# Patient Record
Sex: FEMALE | Race: WHITE | NOT HISPANIC OR LATINO | Employment: FULL TIME | URBAN - METROPOLITAN AREA
[De-identification: names, ages, dates, MRNs, and addresses within clinical notes are randomized per-mention and may not be internally consistent; named-entity substitution may affect disease eponyms.]

---

## 2023-11-15 ENCOUNTER — APPOINTMENT (OUTPATIENT)
Dept: RADIOLOGY | Facility: HOSPITAL | Age: 33
End: 2023-11-15
Payer: COMMERCIAL

## 2023-11-15 ENCOUNTER — HOSPITAL ENCOUNTER (EMERGENCY)
Facility: HOSPITAL | Age: 33
Discharge: HOME | End: 2023-11-15
Attending: EMERGENCY MEDICINE
Payer: COMMERCIAL

## 2023-11-15 VITALS
DIASTOLIC BLOOD PRESSURE: 88 MMHG | OXYGEN SATURATION: 99 % | HEART RATE: 80 BPM | WEIGHT: 220 LBS | BODY MASS INDEX: 40.48 KG/M2 | SYSTOLIC BLOOD PRESSURE: 142 MMHG | TEMPERATURE: 98.1 F | RESPIRATION RATE: 16 BRPM | HEIGHT: 62 IN

## 2023-11-15 DIAGNOSIS — J06.9 VIRAL UPPER RESPIRATORY TRACT INFECTION: Primary | ICD-10-CM

## 2023-11-15 DIAGNOSIS — M62.838 MUSCLE SPASM: ICD-10-CM

## 2023-11-15 LAB
ALBUMIN SERPL BCP-MCNC: 4.5 G/DL (ref 3.4–5)
ALP SERPL-CCNC: 73 U/L (ref 33–110)
ALT SERPL W P-5'-P-CCNC: 9 U/L (ref 7–45)
ANION GAP SERPL CALC-SCNC: 12 MMOL/L (ref 10–20)
AST SERPL W P-5'-P-CCNC: 16 U/L (ref 9–39)
B-HCG SERPL-ACNC: <2 MIU/ML
BASOPHILS # BLD AUTO: 0.06 X10*3/UL (ref 0–0.1)
BASOPHILS NFR BLD AUTO: 0.6 %
BILIRUB SERPL-MCNC: 0.4 MG/DL (ref 0–1.2)
BNP SERPL-MCNC: 20 PG/ML (ref 0–99)
BUN SERPL-MCNC: 14 MG/DL (ref 6–23)
CALCIUM SERPL-MCNC: 9.5 MG/DL (ref 8.6–10.3)
CARDIAC TROPONIN I PNL SERPL HS: <3 NG/L (ref 0–13)
CARDIAC TROPONIN I PNL SERPL HS: <3 NG/L (ref 0–13)
CHLORIDE SERPL-SCNC: 101 MMOL/L (ref 98–107)
CO2 SERPL-SCNC: 29 MMOL/L (ref 21–32)
CREAT SERPL-MCNC: 0.8 MG/DL (ref 0.5–1.05)
EOSINOPHIL # BLD AUTO: 0.6 X10*3/UL (ref 0–0.7)
EOSINOPHIL NFR BLD AUTO: 5.9 %
ERYTHROCYTE [DISTWIDTH] IN BLOOD BY AUTOMATED COUNT: 12.4 % (ref 11.5–14.5)
FLUAV RNA RESP QL NAA+PROBE: NOT DETECTED
FLUBV RNA RESP QL NAA+PROBE: NOT DETECTED
GFR SERPL CREATININE-BSD FRML MDRD: >90 ML/MIN/1.73M*2
GLUCOSE SERPL-MCNC: 88 MG/DL (ref 74–99)
HCT VFR BLD AUTO: 41 % (ref 36–46)
HGB BLD-MCNC: 13 G/DL (ref 12–16)
IMM GRANULOCYTES # BLD AUTO: 0.03 X10*3/UL (ref 0–0.7)
IMM GRANULOCYTES NFR BLD AUTO: 0.3 % (ref 0–0.9)
LYMPHOCYTES # BLD AUTO: 2.28 X10*3/UL (ref 1.2–4.8)
LYMPHOCYTES NFR BLD AUTO: 22.6 %
MCH RBC QN AUTO: 28.1 PG (ref 26–34)
MCHC RBC AUTO-ENTMCNC: 31.7 G/DL (ref 32–36)
MCV RBC AUTO: 89 FL (ref 80–100)
MONOCYTES # BLD AUTO: 0.86 X10*3/UL (ref 0.1–1)
MONOCYTES NFR BLD AUTO: 8.5 %
NEUTROPHILS # BLD AUTO: 6.26 X10*3/UL (ref 1.2–7.7)
NEUTROPHILS NFR BLD AUTO: 62.1 %
NRBC BLD-RTO: 0 /100 WBCS (ref 0–0)
PLATELET # BLD AUTO: 344 X10*3/UL (ref 150–450)
POTASSIUM SERPL-SCNC: 3.8 MMOL/L (ref 3.5–5.3)
PROT SERPL-MCNC: 7.7 G/DL (ref 6.4–8.2)
RBC # BLD AUTO: 4.62 X10*6/UL (ref 4–5.2)
S PYO DNA THROAT QL NAA+PROBE: NOT DETECTED
SARS-COV-2 RNA RESP QL NAA+PROBE: NOT DETECTED
SODIUM SERPL-SCNC: 138 MMOL/L (ref 136–145)
WBC # BLD AUTO: 10.1 X10*3/UL (ref 4.4–11.3)

## 2023-11-15 PROCEDURE — 71046 X-RAY EXAM CHEST 2 VIEWS: CPT | Mod: FOREIGN READ | Performed by: RADIOLOGY

## 2023-11-15 PROCEDURE — 87636 SARSCOV2 & INF A&B AMP PRB: CPT | Performed by: EMERGENCY MEDICINE

## 2023-11-15 PROCEDURE — 84075 ASSAY ALKALINE PHOSPHATASE: CPT | Performed by: EMERGENCY MEDICINE

## 2023-11-15 PROCEDURE — 99283 EMERGENCY DEPT VISIT LOW MDM: CPT | Mod: 25

## 2023-11-15 PROCEDURE — 84484 ASSAY OF TROPONIN QUANT: CPT

## 2023-11-15 PROCEDURE — 84702 CHORIONIC GONADOTROPIN TEST: CPT | Performed by: EMERGENCY MEDICINE

## 2023-11-15 PROCEDURE — 94760 N-INVAS EAR/PLS OXIMETRY 1: CPT

## 2023-11-15 PROCEDURE — 99285 EMERGENCY DEPT VISIT HI MDM: CPT | Performed by: EMERGENCY MEDICINE

## 2023-11-15 PROCEDURE — 36415 COLL VENOUS BLD VENIPUNCTURE: CPT

## 2023-11-15 PROCEDURE — 36415 COLL VENOUS BLD VENIPUNCTURE: CPT | Performed by: EMERGENCY MEDICINE

## 2023-11-15 PROCEDURE — 99285 EMERGENCY DEPT VISIT HI MDM: CPT | Mod: 25 | Performed by: EMERGENCY MEDICINE

## 2023-11-15 PROCEDURE — 83880 ASSAY OF NATRIURETIC PEPTIDE: CPT | Performed by: EMERGENCY MEDICINE

## 2023-11-15 PROCEDURE — 85025 COMPLETE CBC W/AUTO DIFF WBC: CPT | Performed by: EMERGENCY MEDICINE

## 2023-11-15 PROCEDURE — 87651 STREP A DNA AMP PROBE: CPT | Performed by: EMERGENCY MEDICINE

## 2023-11-15 PROCEDURE — 71046 X-RAY EXAM CHEST 2 VIEWS: CPT | Mod: FY

## 2023-11-15 PROCEDURE — 84484 ASSAY OF TROPONIN QUANT: CPT | Performed by: EMERGENCY MEDICINE

## 2023-11-15 RX ORDER — CYCLOBENZAPRINE HCL 5 MG
5 TABLET ORAL 3 TIMES DAILY PRN
Qty: 21 TABLET | Refills: 0 | Status: SHIPPED | OUTPATIENT
Start: 2023-11-15 | End: 2023-11-22

## 2023-11-15 ASSESSMENT — LIFESTYLE VARIABLES
HAVE YOU EVER FELT YOU SHOULD CUT DOWN ON YOUR DRINKING: NO
HAVE PEOPLE ANNOYED YOU BY CRITICIZING YOUR DRINKING: NO
REASON UNABLE TO ASSESS: NO
EVER FELT BAD OR GUILTY ABOUT YOUR DRINKING: NO
EVER HAD A DRINK FIRST THING IN THE MORNING TO STEADY YOUR NERVES TO GET RID OF A HANGOVER: NO

## 2023-11-15 ASSESSMENT — COLUMBIA-SUICIDE SEVERITY RATING SCALE - C-SSRS
2. HAVE YOU ACTUALLY HAD ANY THOUGHTS OF KILLING YOURSELF?: NO
1. IN THE PAST MONTH, HAVE YOU WISHED YOU WERE DEAD OR WISHED YOU COULD GO TO SLEEP AND NOT WAKE UP?: NO
6. HAVE YOU EVER DONE ANYTHING, STARTED TO DO ANYTHING, OR PREPARED TO DO ANYTHING TO END YOUR LIFE?: NO

## 2023-11-15 ASSESSMENT — PAIN - FUNCTIONAL ASSESSMENT: PAIN_FUNCTIONAL_ASSESSMENT: 0-10

## 2023-11-15 ASSESSMENT — PAIN SCALES - GENERAL: PAINLEVEL_OUTOF10: 5 - MODERATE PAIN

## 2023-11-15 NOTE — DISCHARGE INSTRUCTIONS
Follow up with your PCP as needed. Return to the emergency room if your symptoms worsen or you develop trouble breathing, chest pain and continued fevers or chills.

## 2023-11-15 NOTE — ED PROVIDER NOTES
"EMERGENCY DEPARTMENT ENCOUNTER      Pt Name: Mallika Gutiérrez  MRN: [id number]  Birthdate 1990  Date of evaluation: 11/15/2023  Provider: Yrn Kapoor MD    CHIEF COMPLAINT       Chief Complaint   Patient presents with    Mouth Lesions     Patienet was seen in urgent care and dx with tonsilitis on Monday; since the has had a worsening productive cough with pain that radiates to the back and down the R arm, congestion, chest tightness, lacerations to the mouth that \"have worsened\" and a sore throat. Patient started the PO Amoxicillin Monday with no improvement         HISTORY OF PRESENT ILLNESS    HPI  Mallika Gutiérrez is a 33-year-old female with a past medical history of anxiety and depression who reports to the ED with chief complaint of URI symptoms.  Patient states that last Friday she began to experience a sore throat but that it progressed to a productive cough, congestion and rhinorrhea.  She went to urgent care on Monday and was diagnosed with tonsillitis and started on amoxicillin.  She is still continued to take the Amoxil and has not completed the antibiotic course.  She reports that her URI symptoms have worsened since onset which prompted her day come to the ED.  She reports that she is also been having \"firework-like\" back pain that spreads to her right arm. The pain is mostly exacerbated by coughing.  She has tried Tylenol but has not seem to get any type of relief.  She also endorses fevers, chills, and mouth ulcer on her hard palate but denies headaches, vision changes, chest pain, shortness of breath, palpitations, abdominal pain, nausea, vomiting and diarrhea.      Nursing Notes were reviewed.    PAST MEDICAL HISTORY   History reviewed. No pertinent past medical history.      SURGICAL HISTORY     History reviewed. No pertinent surgical history.      CURRENT MEDICATIONS       Discharge Medication List as of 11/15/2023  6:07 PM          ALLERGIES     Ceclor [cefaclor]    FAMILY HISTORY     No " family history on file.       SOCIAL HISTORY       Social History     Socioeconomic History    Marital status: Single     Spouse name: None    Number of children: None    Years of education: None    Highest education level: None   Occupational History    None   Tobacco Use    Smoking status: Never    Smokeless tobacco: Never   Vaping Use    Vaping Use: Never used   Substance and Sexual Activity    Alcohol use: None    Drug use: Never    Sexual activity: None   Other Topics Concern    None   Social History Narrative    None     Social Determinants of Health     Financial Resource Strain: Not on file   Food Insecurity: Not on file   Transportation Needs: Not on file   Physical Activity: Not on file   Stress: Not on file   Social Connections: Not on file   Intimate Partner Violence: Not on file   Housing Stability: Not on file       SCREENINGS                        PHYSICAL EXAM    (up to 7 for level 4, 8 or more for level 5)     ED Triage Vitals   Temp Heart Rate Resp BP   11/15/23 1518 11/15/23 1518 11/15/23 1518 11/15/23 1518   36.7 °C (98.1 °F) 84 16 142/88      SpO2 Temp src Heart Rate Source Patient Position   11/15/23 1518 -- 11/15/23 1801 11/15/23 1518   99 %  Monitor Sitting      BP Location FiO2 (%)     11/15/23 1518 --     Right arm        REVIEW OF SYSTEMS:    CONSTITUTIONAL: Reports denies fever, chills.   NEURO: Denies headache.   HEENT: Reports sore throat, rhinorrhea Denies changes in vision.   CARDIO: Denies chest pain, palpitations.  PULM: Reports cough Denies shortness of breath  GI: Denies abdominal pain, nausea, vomiting       Physical Exam  Vitals and nursing note reviewed.   Constitutional:       General: She is not in acute distress.  HENT:      Right Ear: Tympanic membrane, ear canal and external ear normal.      Left Ear: Tympanic membrane, ear canal and external ear normal.      Nose: Congestion and rhinorrhea present.      Mouth/Throat:      Mouth: Mucous membranes are moist.      Pharynx:  No oropharyngeal exudate or posterior oropharyngeal erythema.   Cardiovascular:      Rate and Rhythm: Normal rate and regular rhythm.      Pulses: Normal pulses.      Heart sounds: Normal heart sounds. No murmur heard.     No gallop.   Pulmonary:      Effort: Pulmonary effort is normal.      Breath sounds: Wheezing present.   Abdominal:      General: Abdomen is flat. Bowel sounds are normal.      Palpations: Abdomen is soft.      Tenderness: There is no abdominal tenderness.   Musculoskeletal:      Cervical back: Neck supple.   Skin:     General: Skin is warm and dry.      Capillary Refill: Capillary refill takes less than 2 seconds.   Neurological:      Mental Status: She is alert and oriented to person, place, and time.   Psychiatric:         Mood and Affect: Mood normal.         Behavior: Behavior normal.          DIAGNOSTIC RESULTS     LABS:  Labs Reviewed   CBC WITH AUTO DIFFERENTIAL - Abnormal       Result Value    WBC 10.1      nRBC 0.0      RBC 4.62      Hemoglobin 13.0      Hematocrit 41.0      MCV 89      MCH 28.1      MCHC 31.7 (*)     RDW 12.4      Platelets 344      Neutrophils % 62.1      Immature Granulocytes %, Automated 0.3      Lymphocytes % 22.6      Monocytes % 8.5      Eosinophils % 5.9      Basophils % 0.6      Neutrophils Absolute 6.26      Immature Granulocytes Absolute, Automated 0.03      Lymphocytes Absolute 2.28      Monocytes Absolute 0.86      Eosinophils Absolute 0.60      Basophils Absolute 0.06     GROUP A STREPTOCOCCUS, PCR - Normal    Group A Strep PCR Not Detected     COMPREHENSIVE METABOLIC PANEL - Normal    Glucose 88      Sodium 138      Potassium 3.8      Chloride 101      Bicarbonate 29      Anion Gap 12      Urea Nitrogen 14      Creatinine 0.80      eGFR >90      Calcium 9.5      Albumin 4.5      Alkaline Phosphatase 73      Total Protein 7.7      AST 16      Bilirubin, Total 0.4      ALT 9     B-TYPE NATRIURETIC PEPTIDE - Normal    BNP 20      Narrative:        <100 pg/mL  - Heart failure unlikely  100-299 pg/mL - Intermediate probability of acute heart                  failure exacerbation. Correlate with clinical                  context and patient history.    >=300 pg/mL - Heart Failure likely. Correlate with clinical                  context and patient history.    BNP testing is performed using different testing methodology at [facility] than at other [facility]. Direct result comparisons should only be made within the same method.      TROPONIN I, HIGH SENSITIVITY - Normal    Troponin I, High Sensitivity <3      Narrative:     Less than 99th percentile of normal range cutoff-  Female and children under 18 years old <14 ng/L; Male <21 ng/L: Negative  Repeat testing should be performed if clinically indicated.     Female and children under 18 years old 14-50 ng/L; Male 21-50 ng/L:  Consistent with possible cardiac damage and possible increased clinical   risk. Serial measurements may help to assess extent of myocardial damage.     >50 ng/L: Consistent with cardiac damage, increased clinical risk and  myocardial infarction. Serial measurements may help assess extent of   myocardial damage.      NOTE: Children less than 1 year old may have higher baseline troponin   levels and results should be interpreted in conjunction with the overall   clinical context.     NOTE: Troponin I testing is performed using a different   testing methodology at [facility] than at other   [facility]. Direct result comparisons should only   be made within the same method.   HUMAN CHORIONIC GONADOTROPIN, SERUM QUANTITATIVE - Normal    HCG, Beta-Quantitative <2      Narrative:      Total HCG measurement is performed using the [employer] Access   Immunoassay which detects intact HCG and free beta HCG subunit.    This test is not indicated for use as a tumor marker.   HCG testing is performed using a different test methodology at [facility] than  [facility]. Direct result comparison   should only be made within the same method.       SARS-COV-2 PCR, SYMPTOMATIC - Normal    Coronavirus 2019, PCR Not Detected      Narrative:     This assay has received FDA Emergency Use Authorization (EUA) and is only authorized for the duration of time that circumstances exist to justify the authorization of the emergency use of in vitro diagnostic tests for the detection of SARS-CoV-2 virus and/or diagnosis of COVID-19 infection under section 564(b)(1) of the Act, 21 U.S.C. 360bbb-3(b)(1). This assay is an in vitro diagnostic nucleic acid amplification test for the qualitative detection of SARS-CoV-2 from nasopharyngeal specimens and has been validated for use at [facility]. Negative results do not preclude COVID-19 infections and should not be used as the sole basis for diagnosis, treatment, or other management decisions.     INFLUENZA A AND B PCR - Normal    Flu A Result Not Detected      Flu B Result Not Detected      Narrative:     This assay is an in vitro diagnostic multiplex nucleic acid amplification test for the detection and discrimination of Influenza A & B from nasopharyngeal specimens, and has been validated for use at [facility]. Negative results do not preclude Influenza A/B infections, and should not be used as the sole basis for diagnosis, treatment, or other management decisions. If Influenza A/B and RSV PCR results are negative, testing for Parainfluenza virus, Adenovirus and Metapneumovirus is routinely performed for [facility] and intensive care inpatients, and is available on other patients by placing an add-on request.   TROPONIN I, HIGH SENSITIVITY - Normal    Troponin I, High Sensitivity <3      Narrative:     Less than 99th percentile of normal range cutoff-  Female and children under 18 years old <14 ng/L; Male <21 ng/L: Negative  Repeat testing should be performed if  "clinically indicated.     Female and children under 18 years old 14-50 ng/L; Male 21-50 ng/L:  Consistent with possible cardiac damage and possible increased clinical   risk. Serial measurements may help to assess extent of myocardial damage.     >50 ng/L: Consistent with cardiac damage, increased clinical risk and  myocardial infarction. Serial measurements may help assess extent of   myocardial damage.      NOTE: Children less than 1 year old may have higher baseline troponin   levels and results should be interpreted in conjunction with the overall   clinical context.     NOTE: Troponin I testing is performed using a different   testing methodology at [facility] than at other   [facility]. Direct result comparisons should only   be made within the same method.       All other labs were within normal range or not returned as of this dictation.    Imaging  XR chest 2 views   Final Result   No detectable active cardiopulmonary disease.   Signed by Kayden Rey MD           Procedures  Procedures     EMERGENCY DEPARTMENT COURSE/MDM:     Diagnoses as of 11/15/23 1839   Muscle spasm   Viral upper respiratory tract infection        Medical Decision Making  Mallika Gutiérrez is a 33-year-old female with a past medical history of anxiety and depression who reports to the ED with chief complaint of worsening URI symptoms and \"firework-like\" back pain that spreads to her right arm when she coughs. Upon arrival to the ED, patient's vitals were stable. She denies any cardiovascular risk factors. Heart score was noted to be 0. She did have some wheezing noted on physical exam especially during coughing episodes, otherwise there were no other concerns and she was well-appearing. We still proceeded with a cardiac work up in order to rule out any atypical presentations of ACS.     EKG showed normal sinus rhythm with no ST segment abnormalities. No signs of ischemia noted    There were no concerns on blood work. " Chest x-ray also showed no acute cardiopulmomary processes.    COVID, influenza, and group A strep swabs were negative.    Patient's symptoms is likely due to a viral respiratory infection.  Her back pain is exacerbated by coughing and therefore is likely due to irritation of her muscles from repetitive coughing.  We instructed her to continue with her amoxicillin antibiotic course but sent her home with a prescription for Flexeril for her MSK pain. Instructed her to take over the counter cough medication for extra support and return to the ED if her symptoms worsen or she develops chest pain or trouble breathing.     Patient and or family in agreement and understanding of treatment plan.  All questions answered.      I reviewed the case with the attending ED physician. The attending ED physician agrees with the plan. Patient and/or patient´s representative was counseled regarding labs, imaging, likely diagnosis, and plan. All questions were answered.    ED Medications administered this visit:  Medications - No data to display    New Prescriptions from this visit:    Discharge Medication List as of 11/15/2023  6:07 PM        START taking these medications    Details   cyclobenzaprine (Flexeril) 5 mg tablet Take 1 tablet (5 mg) by mouth 3 times a day as needed for muscle spasms for up to 7 days., Starting Wed 11/15/2023, Until Wed 11/22/2023 at 2359, Normal             Follow-up:  Leslie Matos MD MPH  [address] [place] [address]  [phone]      As needed        Final Impression:   1. Viral upper respiratory tract infection    2. Muscle spasm          (Please note that portions of this note were completed with a voice recognition program.  Efforts were made to edit the dictations but occasionally words are mis-transcribed.)     Yrn Kapoor MD  Resident  11/15/23 3309

## 2023-11-17 ENCOUNTER — HOSPITAL ENCOUNTER (OUTPATIENT)
Dept: CARDIOLOGY | Facility: HOSPITAL | Age: 33
Discharge: HOME | End: 2023-11-17
Payer: COMMERCIAL

## 2023-11-17 LAB
ATRIAL RATE: 85 BPM
ATRIAL RATE: 87 BPM
P AXIS: -1 DEGREES
P AXIS: 43 DEGREES
P OFFSET: 171 MS
P OFFSET: 214 MS
P ONSET: 122 MS
P ONSET: 158 MS
PR INTERVAL: 142 MS
PR INTERVAL: 170 MS
Q ONSET: 207 MS
Q ONSET: 229 MS
QRS COUNT: 14 BEATS
QRS COUNT: 14 BEATS
QRS DURATION: 90 MS
QRS DURATION: 94 MS
QT INTERVAL: 354 MS
QT INTERVAL: 384 MS
QTC CALCULATION(BAZETT): 421 MS
QTC CALCULATION(BAZETT): 462 MS
QTC FREDERICIA: 397 MS
QTC FREDERICIA: 434 MS
R AXIS: -21 DEGREES
R AXIS: 93 DEGREES
T AXIS: 10 DEGREES
T AXIS: 3 DEGREES
T OFFSET: 399 MS
T OFFSET: 406 MS
VENTRICULAR RATE: 85 BPM
VENTRICULAR RATE: 87 BPM

## 2023-11-17 PROCEDURE — 93005 ELECTROCARDIOGRAM TRACING: CPT

## 2023-12-17 ENCOUNTER — HOSPITAL ENCOUNTER (EMERGENCY)
Facility: HOSPITAL | Age: 33
Discharge: HOME | End: 2023-12-17
Attending: EMERGENCY MEDICINE
Payer: COMMERCIAL

## 2023-12-17 VITALS
TEMPERATURE: 97.9 F | WEIGHT: 220 LBS | SYSTOLIC BLOOD PRESSURE: 130 MMHG | HEART RATE: 91 BPM | BODY MASS INDEX: 40.48 KG/M2 | RESPIRATION RATE: 20 BRPM | DIASTOLIC BLOOD PRESSURE: 76 MMHG | OXYGEN SATURATION: 96 % | HEIGHT: 62 IN

## 2023-12-17 DIAGNOSIS — J01.90 ACUTE NON-RECURRENT SINUSITIS, UNSPECIFIED LOCATION: Primary | ICD-10-CM

## 2023-12-17 LAB
FLUAV RNA RESP QL NAA+PROBE: NOT DETECTED
FLUBV RNA RESP QL NAA+PROBE: NOT DETECTED
SARS-COV-2 RNA RESP QL NAA+PROBE: DETECTED

## 2023-12-17 PROCEDURE — 99283 EMERGENCY DEPT VISIT LOW MDM: CPT | Performed by: EMERGENCY MEDICINE

## 2023-12-17 PROCEDURE — 99284 EMERGENCY DEPT VISIT MOD MDM: CPT | Performed by: EMERGENCY MEDICINE

## 2023-12-17 PROCEDURE — 87636 SARSCOV2 & INF A&B AMP PRB: CPT | Performed by: EMERGENCY MEDICINE

## 2023-12-17 RX ORDER — AMOXICILLIN AND CLAVULANATE POTASSIUM 875; 125 MG/1; MG/1
1 TABLET, FILM COATED ORAL EVERY 12 HOURS
Qty: 20 TABLET | Refills: 0 | Status: SHIPPED | OUTPATIENT
Start: 2023-12-17 | End: 2023-12-27

## 2023-12-17 ASSESSMENT — PAIN DESCRIPTION - LOCATION: LOCATION: GENERALIZED

## 2023-12-17 ASSESSMENT — COLUMBIA-SUICIDE SEVERITY RATING SCALE - C-SSRS
6. HAVE YOU EVER DONE ANYTHING, STARTED TO DO ANYTHING, OR PREPARED TO DO ANYTHING TO END YOUR LIFE?: NO
1. IN THE PAST MONTH, HAVE YOU WISHED YOU WERE DEAD OR WISHED YOU COULD GO TO SLEEP AND NOT WAKE UP?: NO
2. HAVE YOU ACTUALLY HAD ANY THOUGHTS OF KILLING YOURSELF?: NO

## 2023-12-17 ASSESSMENT — PAIN SCALES - GENERAL
PAINLEVEL_OUTOF10: 5 - MODERATE PAIN
PAINLEVEL_OUTOF10: 5 - MODERATE PAIN

## 2023-12-17 ASSESSMENT — LIFESTYLE VARIABLES
HAVE YOU EVER FELT YOU SHOULD CUT DOWN ON YOUR DRINKING: NO
EVER HAD A DRINK FIRST THING IN THE MORNING TO STEADY YOUR NERVES TO GET RID OF A HANGOVER: NO
EVER FELT BAD OR GUILTY ABOUT YOUR DRINKING: NO
HAVE PEOPLE ANNOYED YOU BY CRITICIZING YOUR DRINKING: NO
REASON UNABLE TO ASSESS: NO

## 2023-12-17 ASSESSMENT — PAIN - FUNCTIONAL ASSESSMENT: PAIN_FUNCTIONAL_ASSESSMENT: 0-10

## 2023-12-17 ASSESSMENT — PAIN DESCRIPTION - PAIN TYPE: TYPE: ACUTE PAIN

## 2023-12-17 NOTE — ED NOTES
Discharge instructions reviewed with patient. Stable and ambulatory at time of discharge.      Thelma Camarillo RN  12/17/23 5167

## 2023-12-17 NOTE — ED PROVIDER NOTES
HPI   Chief Complaint   Patient presents with   • Flu Symptoms     Cough, fever, horse, sore throat, shortness of breath       33-year-old female presenting to the emergency department.  Patient has been having URI symptoms for the past 2 months she states.  She states that she has been having nasal congestion and cough.  She states cough is intermittently productive.  Patient has been having some sinus fullness over this time as well.  She had a fever this morning which is what prompted her to come to the emergency department to be evaluated.  She denies any chest pain, abdominal pain.  She had some vomiting after coughing but otherwise denies any nausea or vomiting.  No diarrhea.  She denies any dysuria hematuria urinary frequency.  She states she had a negative flu and COVID swab at [facility] yesterday.                          Dwayne Coma Scale Score: 15                  Patient History   No past medical history on file.  No past surgical history on file.  No family history on file.  Social History     Tobacco Use   • Smoking status: Never   • Smokeless tobacco: Never   Vaping Use   • Vaping Use: Never used   Substance Use Topics   • Alcohol use: Not on file   • Drug use: Never       Physical Exam   ED Triage Vitals [12/17/23 0818]   Temp Heart Rate Resp BP   36.6 °C (97.9 °F) 98 18 131/83      SpO2 Temp Source Heart Rate Source Patient Position   95 % Temporal Monitor --      BP Location FiO2 (%)     -- --       Physical Exam  Vitals and nursing note reviewed.   Constitutional:       General: She is not in acute distress.     Appearance: Normal appearance. She is not ill-appearing or toxic-appearing.   HENT:      Head: Normocephalic and atraumatic.      Right Ear: Tympanic membrane normal.      Left Ear: Tympanic membrane normal.      Nose: Congestion present.      Comments: Swelling of the left nasal turbinate.     Mouth/Throat:      Mouth: Mucous membranes are moist.      Pharynx: No oropharyngeal exudate or  posterior oropharyngeal erythema.      Comments: Posterior pharyngeal cobblestoning present.  Uvula midline  Eyes:      General:         Right eye: No discharge.         Left eye: No discharge.      Extraocular Movements: Extraocular movements intact.      Pupils: Pupils are equal, round, and reactive to light.   Neck:      Comments: Full ROM  Cardiovascular:      Rate and Rhythm: Normal rate and regular rhythm.      Pulses: Normal pulses.      Heart sounds: Normal heart sounds.   Pulmonary:      Effort: Pulmonary effort is normal.      Breath sounds: Normal breath sounds.   Abdominal:      General: Abdomen is flat. There is no distension.      Palpations: Abdomen is soft.      Tenderness: There is no abdominal tenderness.   Musculoskeletal:         General: No swelling or tenderness. Normal range of motion.      Cervical back: Normal range of motion and neck supple.   Skin:     General: Skin is warm.      Capillary Refill: Capillary refill takes less than 2 seconds.   Neurological:      General: No focal deficit present.      Mental Status: She is alert and oriented to person, place, and time.      Sensory: No sensory deficit.      Motor: No weakness.   Psychiatric:         Mood and Affect: Mood normal.         Behavior: Behavior normal.         ED Course & MDM   Diagnoses as of 12/17/23 0836   Acute non-recurrent sinusitis, unspecified location       Medical Decision Making  Patient presenting with URI symptoms for 2 months.  Symptoms worsened over the last couple of days and she developed fever again today.  She is afebrile and hemodynamically stable here in the emergency department.  She is in no distress.  She has had some sinus fullness and has swelling in the left nasal turbinate.  Lungs are clear to auscultation.  Patient is refusing COVID and flu testing here she just had it yesterday and was negative.    Given length of symptoms and recurrent fever will treat the patient for sinusitis.  She will be treated  with Augmentin, she states that she has tolerated penicillins in the past.  Patient is given primary care doctor to follow-up with.  She is to return with persistent fevers, any shortness of breath or any new or worsening symptoms.  Patient understands and agrees stated plan.        Procedure  Procedures     Luke Sparks,   12/17/23 0836

## 2023-12-18 ENCOUNTER — TELEPHONE (OUTPATIENT)
Dept: PHARMACY | Facility: HOSPITAL | Age: 33
End: 2023-12-18
Payer: COMMERCIAL

## 2023-12-18 NOTE — PROGRESS NOTES
EDPD Note: Rapid Result Review    Reviewed Ms. Mallika Gutiérrez 's chart regarding a positive COVID-19 culture/result that was taken during their recent emergency room visit. The patient was not told about these results prior to leaving the emergency department. Therefore, patient was contacted and given proper education.         Component  Ref Range & Units    Flu A Result  Not Detected Not Detected   Flu B Result  Not Detected Not Detected   Coronavirus 2019, PCR  Not Detected Detected Abnormal           No further follow up needed from EDPD Team.     Areli Mcguire, PharmD

## 2023-12-27 ENCOUNTER — LAB (OUTPATIENT)
Dept: LAB | Facility: LAB | Age: 33
End: 2023-12-27
Payer: COMMERCIAL

## 2023-12-27 ENCOUNTER — OFFICE VISIT (OUTPATIENT)
Dept: PRIMARY CARE | Facility: CLINIC | Age: 33
End: 2023-12-27
Payer: COMMERCIAL

## 2023-12-27 VITALS
HEART RATE: 88 BPM | DIASTOLIC BLOOD PRESSURE: 82 MMHG | HEIGHT: 62 IN | SYSTOLIC BLOOD PRESSURE: 124 MMHG | TEMPERATURE: 98.1 F | OXYGEN SATURATION: 92 % | BODY MASS INDEX: 44.53 KG/M2 | WEIGHT: 242 LBS

## 2023-12-27 DIAGNOSIS — Z13.220 SCREENING FOR LIPID DISORDERS: ICD-10-CM

## 2023-12-27 DIAGNOSIS — F51.01 PRIMARY INSOMNIA: ICD-10-CM

## 2023-12-27 DIAGNOSIS — Z86.16 HISTORY OF COVID-19: Primary | ICD-10-CM

## 2023-12-27 DIAGNOSIS — Z80.8 FAMILY HISTORY OF THYROID CANCER: ICD-10-CM

## 2023-12-27 DIAGNOSIS — Z86.16 HISTORY OF COVID-19: ICD-10-CM

## 2023-12-27 DIAGNOSIS — F32.A CHRONIC DEPRESSION: ICD-10-CM

## 2023-12-27 DIAGNOSIS — Z13.29 SCREENING FOR THYROID DISORDER: ICD-10-CM

## 2023-12-27 LAB
ALBUMIN SERPL BCP-MCNC: 4.2 G/DL (ref 3.4–5)
ALP SERPL-CCNC: 73 U/L (ref 33–110)
ALT SERPL W P-5'-P-CCNC: 12 U/L (ref 7–45)
ANION GAP SERPL CALC-SCNC: 14 MMOL/L (ref 10–20)
AST SERPL W P-5'-P-CCNC: 15 U/L (ref 9–39)
BASOPHILS # BLD AUTO: 0.07 X10*3/UL (ref 0–0.1)
BASOPHILS NFR BLD AUTO: 0.6 %
BILIRUB SERPL-MCNC: 0.5 MG/DL (ref 0–1.2)
BUN SERPL-MCNC: 13 MG/DL (ref 6–23)
CALCIUM SERPL-MCNC: 9.4 MG/DL (ref 8.6–10.3)
CHLORIDE SERPL-SCNC: 103 MMOL/L (ref 98–107)
CHOLEST SERPL-MCNC: 219 MG/DL (ref 0–199)
CHOLESTEROL/HDL RATIO: 5.9
CO2 SERPL-SCNC: 28 MMOL/L (ref 21–32)
CREAT SERPL-MCNC: 0.9 MG/DL (ref 0.5–1.05)
EOSINOPHIL # BLD AUTO: 0.31 X10*3/UL (ref 0–0.7)
EOSINOPHIL NFR BLD AUTO: 2.6 %
ERYTHROCYTE [DISTWIDTH] IN BLOOD BY AUTOMATED COUNT: 12.2 % (ref 11.5–14.5)
GFR SERPL CREATININE-BSD FRML MDRD: 86 ML/MIN/1.73M*2
GLUCOSE SERPL-MCNC: 93 MG/DL (ref 74–99)
HCT VFR BLD AUTO: 42.2 % (ref 36–46)
HDLC SERPL-MCNC: 37.3 MG/DL
HGB BLD-MCNC: 13.3 G/DL (ref 12–16)
IMM GRANULOCYTES # BLD AUTO: 0.12 X10*3/UL (ref 0–0.7)
IMM GRANULOCYTES NFR BLD AUTO: 1 % (ref 0–0.9)
LDLC SERPL CALC-MCNC: 153 MG/DL
LYMPHOCYTES # BLD AUTO: 4.65 X10*3/UL (ref 1.2–4.8)
LYMPHOCYTES NFR BLD AUTO: 39.5 %
MCH RBC QN AUTO: 28.6 PG (ref 26–34)
MCHC RBC AUTO-ENTMCNC: 31.5 G/DL (ref 32–36)
MCV RBC AUTO: 91 FL (ref 80–100)
MONOCYTES # BLD AUTO: 0.74 X10*3/UL (ref 0.1–1)
MONOCYTES NFR BLD AUTO: 6.3 %
NEUTROPHILS # BLD AUTO: 5.89 X10*3/UL (ref 1.2–7.7)
NEUTROPHILS NFR BLD AUTO: 50 %
NON HDL CHOLESTEROL: 182 MG/DL (ref 0–149)
NRBC BLD-RTO: 0 /100 WBCS (ref 0–0)
PLATELET # BLD AUTO: 474 X10*3/UL (ref 150–450)
POTASSIUM SERPL-SCNC: 4.8 MMOL/L (ref 3.5–5.3)
PROT SERPL-MCNC: 7.2 G/DL (ref 6.4–8.2)
RBC # BLD AUTO: 4.65 X10*6/UL (ref 4–5.2)
SODIUM SERPL-SCNC: 140 MMOL/L (ref 136–145)
TRIGL SERPL-MCNC: 144 MG/DL (ref 0–149)
TSH SERPL-ACNC: 0.95 MIU/L (ref 0.44–3.98)
VLDL: 29 MG/DL (ref 0–40)
WBC # BLD AUTO: 11.8 X10*3/UL (ref 4.4–11.3)

## 2023-12-27 PROCEDURE — 80061 LIPID PANEL: CPT

## 2023-12-27 PROCEDURE — 84443 ASSAY THYROID STIM HORMONE: CPT

## 2023-12-27 PROCEDURE — 3008F BODY MASS INDEX DOCD: CPT | Performed by: INTERNAL MEDICINE

## 2023-12-27 PROCEDURE — 80053 COMPREHEN METABOLIC PANEL: CPT

## 2023-12-27 PROCEDURE — 99204 OFFICE O/P NEW MOD 45 MIN: CPT | Performed by: INTERNAL MEDICINE

## 2023-12-27 PROCEDURE — 85025 COMPLETE CBC W/AUTO DIFF WBC: CPT

## 2023-12-27 PROCEDURE — 1036F TOBACCO NON-USER: CPT | Performed by: INTERNAL MEDICINE

## 2023-12-27 PROCEDURE — 36415 COLL VENOUS BLD VENIPUNCTURE: CPT

## 2023-12-27 RX ORDER — TRAZODONE HYDROCHLORIDE 100 MG/1
100 TABLET ORAL NIGHTLY PRN
COMMUNITY
Start: 2023-08-07

## 2023-12-27 RX ORDER — ESCITALOPRAM OXALATE 10 MG/1
10 TABLET ORAL DAILY
COMMUNITY
Start: 2023-12-16

## 2023-12-27 RX ORDER — LAMOTRIGINE 100 MG/1
1 TABLET ORAL NIGHTLY
COMMUNITY
Start: 2023-12-16

## 2023-12-27 ASSESSMENT — ENCOUNTER SYMPTOMS
OCCASIONAL FEELINGS OF UNSTEADINESS: 0
DEPRESSION: 0
LOSS OF SENSATION IN FEET: 0

## 2023-12-27 ASSESSMENT — PATIENT HEALTH QUESTIONNAIRE - PHQ9
2. FEELING DOWN, DEPRESSED OR HOPELESS: SEVERAL DAYS
SUM OF ALL RESPONSES TO PHQ9 QUESTIONS 1 & 2: 2
10. IF YOU CHECKED OFF ANY PROBLEMS, HOW DIFFICULT HAVE THESE PROBLEMS MADE IT FOR YOU TO DO YOUR WORK, TAKE CARE OF THINGS AT HOME, OR GET ALONG WITH OTHER PEOPLE: NOT DIFFICULT AT ALL
1. LITTLE INTEREST OR PLEASURE IN DOING THINGS: SEVERAL DAYS

## 2023-12-27 NOTE — PROGRESS NOTES
Ms. Gutiérrez Is seen my office as a new patient.  She was recently diagnosed to have a COVID-19 infection on 17 December.  Today is the 10th day and she wants to return to work.  She has brought a form from her employer to be filled.  She has some cough otherwise no other significant symptoms at this time.  She denies any fever or chills.  No chest pain or palpitation.  Has no shortness of breath or wheezing.  Has no nausea matting pain abdomen.  She has no weakness of any extremity.  Denies any cold intolerance any symptoms thyroid dysfunction.  Review of other systems are negative.    Past medical history:  1.  Recent COVID-19 infection  2.  Chronic depression  3.  Insomnia and  4.  Obesity  5.  Family history of thyroid cancer    Social history: She does not to smoke or drink.  She is not .  She works as a  at [employer].  She does walk regularly.    Family history: Mother and sister had thyroid cancer.  There is no family history of premature coronary artery disease.    On examination:  General examination: Overweight.  BMI is 44.2 today  Eyes: There is no pallor or jaundice pupils are equal and reactive to light  Neck: There is no thyroid enlargement or JVD  Lungs: Breath sounds are normal  CVS: Heart sounds are regular there is no gallop murmur  Abdomen: There is no megaly tenderness  CNS: Normal power  Musculoskeletal system there is no joint swelling  Legs: No edema  Skin: No rash    Assessment and plan  1.  Recent COVID-19 infection: Patient has no significant residual symptoms.  Today is the 10th day from infection form is filled for her to return to work from tomorrow.  2.  History of chronic depression: Patient is being followed by psychiatrist.  She is on Lamictal and Lexapro  3.  History of insomnia: She is taking trazodone as needed  4.  Family history of thyroid cancer: I will check her thyroid function test.  Also referral is given for endocrinologist in view of family  history of thyroid cancer and she needs to be screened for Multiple endocrine neoplasia.  5.  Severe obesity: Patient is interested   in getting treatment for severe obesity.  Patient is being referred to the physicians who can prescribe medications for weight loss.  Lifestyle modifications were also discussed with the patient  6.  Health maintenance: Routine blood test and lipid panel is being ordered.  She will be seen in the office in a few weeks depending on the result of the test ordered today.

## 2023-12-28 ENCOUNTER — TELEPHONE (OUTPATIENT)
Dept: PRIMARY CARE | Facility: CLINIC | Age: 33
End: 2023-12-28
Payer: COMMERCIAL

## 2023-12-28 NOTE — TELEPHONE ENCOUNTER
Left a detailed message for patient.    ----- Message from Can Rocha MD sent at 12/27/2023  6:55 PM EST -----  Patient has high cholesterol, high white cell count and platelet count.  She needs to be seen in 2 to 3 weeks to discuss the treatment options.  In the meantime she should continue with a low-fat diet and exercise  ----- Message -----  From: Lab, Background User  Sent: 12/27/2023   3:59 PM EST  To: Can Rocha MD

## 2024-01-08 ENCOUNTER — OFFICE VISIT (OUTPATIENT)
Dept: PRIMARY CARE | Facility: CLINIC | Age: 34
End: 2024-01-08
Payer: COMMERCIAL

## 2024-01-08 VITALS
WEIGHT: 243 LBS | SYSTOLIC BLOOD PRESSURE: 131 MMHG | DIASTOLIC BLOOD PRESSURE: 84 MMHG | HEIGHT: 63 IN | TEMPERATURE: 98 F | BODY MASS INDEX: 43.05 KG/M2 | HEART RATE: 87 BPM

## 2024-01-08 DIAGNOSIS — E78.2 MIXED HYPERLIPIDEMIA: ICD-10-CM

## 2024-01-08 DIAGNOSIS — Z23 IMMUNIZATION DUE: ICD-10-CM

## 2024-01-08 DIAGNOSIS — Z71.3 ENCOUNTER FOR WEIGHT LOSS COUNSELING: Primary | ICD-10-CM

## 2024-01-08 DIAGNOSIS — K76.0 FATTY LIVER DISEASE, NONALCOHOLIC: ICD-10-CM

## 2024-01-08 DIAGNOSIS — E66.01 CLASS 3 SEVERE OBESITY DUE TO EXCESS CALORIES WITH SERIOUS COMORBIDITY AND BODY MASS INDEX (BMI) OF 40.0 TO 44.9 IN ADULT (MULTI): ICD-10-CM

## 2024-01-08 PROBLEM — E66.813 CLASS 3 SEVERE OBESITY DUE TO EXCESS CALORIES WITH SERIOUS COMORBIDITY AND BODY MASS INDEX (BMI) OF 40.0 TO 44.9 IN ADULT: Status: ACTIVE | Noted: 2024-01-08

## 2024-01-08 PROBLEM — R25.2 SPASM: Status: ACTIVE | Noted: 2024-01-08

## 2024-01-08 PROBLEM — R00.2 PALPITATIONS: Status: ACTIVE | Noted: 2024-01-08

## 2024-01-08 PROCEDURE — 1036F TOBACCO NON-USER: CPT | Performed by: INTERNAL MEDICINE

## 2024-01-08 PROCEDURE — 3008F BODY MASS INDEX DOCD: CPT | Performed by: INTERNAL MEDICINE

## 2024-01-08 PROCEDURE — 90715 TDAP VACCINE 7 YRS/> IM: CPT | Performed by: INTERNAL MEDICINE

## 2024-01-08 PROCEDURE — 90471 IMMUNIZATION ADMIN: CPT | Performed by: INTERNAL MEDICINE

## 2024-01-08 PROCEDURE — 99213 OFFICE O/P EST LOW 20 MIN: CPT | Performed by: INTERNAL MEDICINE

## 2024-01-08 RX ORDER — METFORMIN HYDROCHLORIDE 500 MG/1
500 TABLET ORAL
Qty: 30 TABLET | Refills: 11 | Status: SHIPPED | OUTPATIENT
Start: 2024-01-08 | End: 2024-02-07 | Stop reason: SDUPTHER

## 2024-01-08 NOTE — PROGRESS NOTES
Subjective   Patient ID: Mallika Gutiérrez is a 33 y.o. female who presents for Weight Management.    HPI Patient is having difficulty losing weight and having difficulty losing weight for about 1 year. She has tried diet with green leafy vegetables lean meats, staying away from fried foods, grease. Patient walks at work a lot. She gets approximately 30-40K steps a day. She has been tested for PCOS and negative in the past per the patient.  Patient has history of palpitations, hyperlipidemia, nonalcoholic fatty liver disease, severe obesity and her medical history.  She was referred to us for weight management by her primary care physician.  There is a history of thyroid cancer in her family she is waiting to see an endocrinologist for further evaluation.  We discussed options for weight loss management with her.  Because of her history of palpitations she may not be the best candidate for phentermine at this time and also because of history of thyroid cancer would avoid medications such as Ozempic Wegovy Mounjaro until evaluated by endocrinology and deemed safe.  We could consider topiramate as well as metformin as well as Contrave.  She is already on trazodone as well as Lamictal and escitalopram therefore Contrave and topiramate may also not be a good choice for her due to possible drug interactions with those medications.  We also discussed speaking with a dietitian which she has willing to do this and also she is due for a tetanus vaccine which will be updated today.    Review of Systems   Constitutional:  Negative for chills and fever.   Eyes:  Negative for visual disturbance.   Respiratory:  Negative for cough and shortness of breath.    Cardiovascular:  Negative for chest pain, palpitations and leg swelling.   Gastrointestinal:  Negative for abdominal pain.   Skin:  Negative for rash.   Neurological:  Negative for dizziness and seizures.   Psychiatric/Behavioral:  Negative for agitation.        Objective   BP  "131/84   Pulse 87   Temp 36.7 °C (98 °F) (Temporal)   Ht 1.6 m (5' 3\")   Wt 110 kg (243 lb)   BMI 43.05 kg/m²     Physical Exam  Vitals and nursing note reviewed.   Constitutional:       General: She is not in acute distress.     Appearance: Normal appearance. She is obese. She is not ill-appearing or toxic-appearing.   HENT:      Head: Normocephalic and atraumatic.      Mouth/Throat:      Mouth: Mucous membranes are moist.      Pharynx: Oropharynx is clear. No oropharyngeal exudate.   Eyes:      Pupils: Pupils are equal, round, and reactive to light.   Cardiovascular:      Rate and Rhythm: Normal rate and regular rhythm.      Heart sounds: Normal heart sounds.   Pulmonary:      Effort: Pulmonary effort is normal. No respiratory distress.      Breath sounds: Normal breath sounds. No wheezing or rales.   Abdominal:      General: Bowel sounds are normal. There is no distension.      Palpations: Abdomen is soft. There is no mass.      Tenderness: There is no abdominal tenderness. There is no guarding.   Musculoskeletal:      Cervical back: Neck supple. No tenderness.      Right lower leg: No edema.      Left lower leg: No edema.   Skin:     General: Skin is warm and dry.   Neurological:      General: No focal deficit present.      Mental Status: She is alert and oriented to person, place, and time.   Psychiatric:         Mood and Affect: Mood normal.         Behavior: Behavior normal.         Thought Content: Thought content normal.         Judgment: Judgment normal.         Assessment/Plan   Problem List Items Addressed This Visit             ICD-10-CM    Encounter for weight loss counseling - Primary Z71.3    Relevant Medications    metFORMIN (Glucophage) 500 mg tablet    Other Relevant Orders    Referral to Population Health Services    Class 3 severe obesity due to excess calories with serious comorbidity and body mass index (BMI) of 40.0 to 44.9 in adult (CMS/[facility]) E66.01, Z68.41    Relevant Medications    " metFORMIN (Glucophage) 500 mg tablet    Other Relevant Orders    Referral to Population Health Services    Fatty liver disease, nonalcoholic K76.0    Relevant Medications    metFORMIN (Glucophage) 500 mg tablet    Other Relevant Orders    Referral to Population Health Services    Mixed hyperlipidemia E78.2    Relevant Orders    Referral to Population Health Services    Immunization due Z23    Relevant Orders    Tdap vaccine, age 7 years and older (Completed)     Encounter for weight loss counseling/severe obesity: Start the patient on metformin and also see population health services for dietary management.  Advised to increase physical activity at least 30 to 45 minutes of moderate intensity exercise daily and calorie restriction less than 1500-calorie diet daily.  At this time would not be a good candidate for phentermine or topiramate or Contrave based on her current medications and history.  Also at this time we should avoid GLP-1 agonist medication given family history of thyroid cancer until further evaluation can be done by endocrinology.  Follow-up in 1 month for recheck.

## 2024-01-17 ENCOUNTER — OFFICE VISIT (OUTPATIENT)
Dept: PRIMARY CARE | Facility: CLINIC | Age: 34
End: 2024-01-17
Payer: COMMERCIAL

## 2024-01-17 VITALS
HEIGHT: 63 IN | WEIGHT: 235 LBS | HEART RATE: 85 BPM | BODY MASS INDEX: 41.64 KG/M2 | OXYGEN SATURATION: 97 % | DIASTOLIC BLOOD PRESSURE: 80 MMHG | TEMPERATURE: 97.5 F | SYSTOLIC BLOOD PRESSURE: 122 MMHG

## 2024-01-17 DIAGNOSIS — E78.00 PURE HYPERCHOLESTEROLEMIA: Primary | ICD-10-CM

## 2024-01-17 DIAGNOSIS — Z80.8 FAMILY HISTORY OF THYROID CANCER: ICD-10-CM

## 2024-01-17 PROCEDURE — 3008F BODY MASS INDEX DOCD: CPT | Performed by: INTERNAL MEDICINE

## 2024-01-17 PROCEDURE — 99213 OFFICE O/P EST LOW 20 MIN: CPT | Performed by: INTERNAL MEDICINE

## 2024-01-17 PROCEDURE — 1036F TOBACCO NON-USER: CPT | Performed by: INTERNAL MEDICINE

## 2024-01-17 ASSESSMENT — ENCOUNTER SYMPTOMS
OCCASIONAL FEELINGS OF UNSTEADINESS: 0
LOSS OF SENSATION IN FEET: 0
DEPRESSION: 0

## 2024-01-17 NOTE — PROGRESS NOTES
Patient is seen my office to discuss the result of blood test they did show the patient has hyperlipidemia.  Since last seen by me patient has seen Dr. Alves with started her on metformin as a part of weight management program.  Patient has started eating healthy and trying to lose some weight.  She has no chest pain or palpitation.  Has no shortness of breath or wheezing.  Denies any weakness of any extremity.  Review of other systems is negative.    On examination:  General examination: Obesity is notable  Eyes: There is no pallor jaundice  Lungs: Clear to auscultation  CVS: Heart sounds are regular there is no gallop murmur  Legs: No edema    Assessment and plan  1.  Hyperlipidemia: LDL is 153, non-HDL cholesterol is 182.  Treatment options discussed with the patient.  Patient prefers to try diet and exercise for next few months and have a repeat panel done rather than having start on some medicines.  New order for the lipid panel and liver function test is given to be done between 4 to 6 months.  2.  Obesity: Patient is being followed by Dr. Alves for weight management.  3.  History of thyroid cancer: Patient is waiting to see endocrinologist for further evaluation.  She will be seen my office in 4 to 6 months.

## 2024-01-31 PROBLEM — Z23 IMMUNIZATION DUE: Status: ACTIVE | Noted: 2024-01-31

## 2024-01-31 ASSESSMENT — ENCOUNTER SYMPTOMS
SEIZURES: 0
SHORTNESS OF BREATH: 0
FEVER: 0
PALPITATIONS: 0
DIZZINESS: 0
COUGH: 0
CHILLS: 0
AGITATION: 0
ABDOMINAL PAIN: 0

## 2024-02-07 ENCOUNTER — OFFICE VISIT (OUTPATIENT)
Dept: PRIMARY CARE | Facility: CLINIC | Age: 34
End: 2024-02-07
Payer: COMMERCIAL

## 2024-02-07 VITALS
DIASTOLIC BLOOD PRESSURE: 84 MMHG | BODY MASS INDEX: 41.29 KG/M2 | HEART RATE: 75 BPM | HEIGHT: 63 IN | SYSTOLIC BLOOD PRESSURE: 124 MMHG | WEIGHT: 233 LBS | TEMPERATURE: 98 F

## 2024-02-07 DIAGNOSIS — K76.0 FATTY LIVER DISEASE, NONALCOHOLIC: ICD-10-CM

## 2024-02-07 DIAGNOSIS — E66.01 CLASS 3 SEVERE OBESITY DUE TO EXCESS CALORIES WITH SERIOUS COMORBIDITY AND BODY MASS INDEX (BMI) OF 40.0 TO 44.9 IN ADULT (MULTI): ICD-10-CM

## 2024-02-07 DIAGNOSIS — Z71.3 ENCOUNTER FOR WEIGHT LOSS COUNSELING: Primary | ICD-10-CM

## 2024-02-07 PROCEDURE — 1036F TOBACCO NON-USER: CPT | Performed by: INTERNAL MEDICINE

## 2024-02-07 PROCEDURE — 99213 OFFICE O/P EST LOW 20 MIN: CPT | Performed by: INTERNAL MEDICINE

## 2024-02-07 PROCEDURE — 3008F BODY MASS INDEX DOCD: CPT | Performed by: INTERNAL MEDICINE

## 2024-02-07 RX ORDER — METFORMIN HYDROCHLORIDE 500 MG/1
500 TABLET ORAL
Qty: 180 TABLET | Refills: 1 | Status: SHIPPED | OUTPATIENT
Start: 2024-02-07 | End: 2024-05-22

## 2024-02-07 NOTE — PROGRESS NOTES
"Subjective   Patient ID: Mallika Gutiérrez is a 34 y.o. female who presents for Follow-up (Med management ).    HPI Patient tolerating metformin well.  The first week she had some mild diarrhea which has resolved. She has lost 10 lbs since initiation of metformin. 243lbs to 233lbs.     Review of Systems    Objective   /84   Pulse 75   Temp 36.7 °C (98 °F) (Temporal)   Ht 1.6 m (5' 3\")   Wt 106 kg (233 lb)   BMI 41.27 kg/m²     Physical Exam    Assessment/Plan   Problem List Items Addressed This Visit             ICD-10-CM    Encounter for weight loss counseling - Primary Z71.3    Relevant Medications    metFORMIN (Glucophage) 500 mg tablet    Other Relevant Orders    Referral to Nutrition Services    Class 3 severe obesity due to excess calories with serious comorbidity and body mass index (BMI) of 40.0 to 44.9 in adult (CMS/[facility]) E66.01, Z68.41    Relevant Medications    metFORMIN (Glucophage) 500 mg tablet    Other Relevant Orders    Referral to Nutrition Services    Fatty liver disease, nonalcoholic K76.0    Relevant Medications    metFORMIN (Glucophage) 500 mg tablet    Other Relevant Orders    Referral to Nutrition Services          "

## 2024-05-08 ENCOUNTER — APPOINTMENT (OUTPATIENT)
Dept: PRIMARY CARE | Facility: CLINIC | Age: 34
End: 2024-05-08
Payer: COMMERCIAL

## 2024-05-15 ENCOUNTER — APPOINTMENT (OUTPATIENT)
Dept: PRIMARY CARE | Facility: CLINIC | Age: 34
End: 2024-05-15
Payer: COMMERCIAL

## 2024-05-22 ENCOUNTER — OFFICE VISIT (OUTPATIENT)
Dept: PRIMARY CARE | Facility: CLINIC | Age: 34
End: 2024-05-22
Payer: COMMERCIAL

## 2024-05-22 VITALS
HEIGHT: 63 IN | TEMPERATURE: 97.6 F | HEART RATE: 80 BPM | DIASTOLIC BLOOD PRESSURE: 74 MMHG | WEIGHT: 232 LBS | BODY MASS INDEX: 41.11 KG/M2 | OXYGEN SATURATION: 98 % | SYSTOLIC BLOOD PRESSURE: 108 MMHG

## 2024-05-22 DIAGNOSIS — K76.0 FATTY LIVER DISEASE, NONALCOHOLIC: ICD-10-CM

## 2024-05-22 DIAGNOSIS — N92.6 IRREGULAR PERIODS/MENSTRUAL CYCLES: Primary | ICD-10-CM

## 2024-05-22 DIAGNOSIS — E66.01 CLASS 3 SEVERE OBESITY DUE TO EXCESS CALORIES WITH SERIOUS COMORBIDITY AND BODY MASS INDEX (BMI) OF 40.0 TO 44.9 IN ADULT (MULTI): ICD-10-CM

## 2024-05-22 DIAGNOSIS — E78.2 MIXED HYPERLIPIDEMIA: ICD-10-CM

## 2024-05-22 DIAGNOSIS — Z71.3 ENCOUNTER FOR WEIGHT LOSS COUNSELING: ICD-10-CM

## 2024-05-22 DIAGNOSIS — L68.0 HIRSUTISM: ICD-10-CM

## 2024-05-22 PROCEDURE — 3008F BODY MASS INDEX DOCD: CPT | Performed by: INTERNAL MEDICINE

## 2024-05-22 PROCEDURE — 99214 OFFICE O/P EST MOD 30 MIN: CPT | Performed by: INTERNAL MEDICINE

## 2024-05-22 PROCEDURE — 1036F TOBACCO NON-USER: CPT | Performed by: INTERNAL MEDICINE

## 2024-05-22 RX ORDER — PHENTERMINE HYDROCHLORIDE 37.5 MG/1
37.5 TABLET ORAL
Qty: 30 TABLET | Refills: 0 | Status: SHIPPED | OUTPATIENT
Start: 2024-05-22 | End: 2024-06-21

## 2024-05-22 RX ORDER — METFORMIN HYDROCHLORIDE 850 MG/1
850 TABLET ORAL
Qty: 180 TABLET | Refills: 3 | Status: SHIPPED | OUTPATIENT
Start: 2024-05-22 | End: 2025-05-22

## 2024-05-22 ASSESSMENT — ENCOUNTER SYMPTOMS
SORE THROAT: 0
LIGHT-HEADEDNESS: 0
DIARRHEA: 0
CHILLS: 0
DIZZINESS: 0
DYSURIA: 0
SHORTNESS OF BREATH: 0
NAUSEA: 0
ABDOMINAL PAIN: 0
VOMITING: 0
COUGH: 0
FEVER: 0
AGITATION: 0

## 2024-05-22 NOTE — PROGRESS NOTES
"Subjective   Patient ID: Mallika Gutiérrez is a 34 y.o. female who presents for weight loss.    HPI Patient has been having improved periods that are normal and less facial hair since starting metformin. She has noticed improvement in her mood since being on metformin as well. She is tolerating metformin well. She has had to cut down on sugar because if she does she would have loose stool.  She states she was evaluated for PCOS in the past but her hormone levels were normal she states she also had an ultrasound of the ovaries did not reveal cysts.  She is requesting a reevaluation of her hormones in regards to PCOS given her improvement with metformin and symptoms that are consistent with PCOS.  She is interested in other options for weight loss as well she has lost a total of 3 pounds since January.  We discussed the risks and benefits of phentermine and have decided to pursue this at this time.  We also discussed possible Topamax and Contrave but she would need to discuss this with her psychiatrist before we would put her on those medications.  She denies any history of bipolar disorder.  No heart disease.  Denies any palpitations.    Review of Systems   Constitutional:  Negative for chills and fever.   HENT:  Negative for sore throat.    Eyes:  Negative for visual disturbance.   Respiratory:  Negative for cough and shortness of breath.    Cardiovascular:  Negative for chest pain.   Gastrointestinal:  Negative for abdominal pain, diarrhea, nausea and vomiting.   Genitourinary:  Positive for menstrual problem. Negative for dysuria, pelvic pain and vaginal bleeding.   Skin:  Negative for rash.   Neurological:  Negative for dizziness and light-headedness.   Psychiatric/Behavioral:  Negative for agitation.        Objective   /74 (BP Location: Right arm, Patient Position: Sitting)   Pulse 80   Temp 36.4 °C (97.6 °F)   Ht 1.6 m (5' 3\")   Wt 105 kg (232 lb)   SpO2 98%   BMI 41.10 kg/m²     Physical Exam  Vitals " and nursing note reviewed.   Constitutional:       General: She is not in acute distress.     Appearance: She is obese. She is not ill-appearing, toxic-appearing or diaphoretic.   HENT:      Head: Normocephalic and atraumatic.      Mouth/Throat:      Mouth: Mucous membranes are moist.      Pharynx: Oropharynx is clear. No oropharyngeal exudate.   Eyes:      Pupils: Pupils are equal, round, and reactive to light.   Cardiovascular:      Rate and Rhythm: Normal rate and regular rhythm.      Heart sounds: Normal heart sounds.   Pulmonary:      Effort: Pulmonary effort is normal. No respiratory distress.      Breath sounds: Normal breath sounds. No wheezing, rhonchi or rales.   Musculoskeletal:      Cervical back: Neck supple. No tenderness.      Right lower leg: No edema.      Left lower leg: No edema.   Skin:     General: Skin is warm and dry.      Comments: Facial hair of the chin neck and arms. No Striae. No buffulo hump   Neurological:      General: No focal deficit present.      Mental Status: She is alert and oriented to person, place, and time.      Cranial Nerves: No cranial nerve deficit.   Psychiatric:         Mood and Affect: Mood normal.         Behavior: Behavior normal.         Thought Content: Thought content normal.         Judgment: Judgment normal.         Assessment/Plan   Problem List Items Addressed This Visit             ICD-10-CM    Encounter for weight loss counseling Z71.3    Relevant Medications    metFORMIN (Glucophage) 850 mg tablet    phentermine (Adipex-P) 37.5 mg tablet    Class 3 severe obesity due to excess calories with serious comorbidity and body mass index (BMI) of 40.0 to 44.9 in adult ([facility]) E66.01, Z68.41    Relevant Medications    metFORMIN (Glucophage) 850 mg tablet    phentermine (Adipex-P) 37.5 mg tablet    Other Relevant Orders    FSH    Prolactin level    Testosterone, total and free    DHEA level    17-Hydroxyprogesterone    Luteinizing hormone    Antimullerian hormone  ([facility])    Fatty liver disease, nonalcoholic K76.0    Mixed hyperlipidemia E78.2    Hirsutism L68.0    Relevant Orders    FSH    Prolactin level    Testosterone, total and free    DHEA level    17-Hydroxyprogesterone    Luteinizing hormone    Antimullerian hormone ([facility])    Irregular periods/menstrual cycles - Primary N92.6    Relevant Orders    FSH    Prolactin level    Testosterone, total and free    DHEA level    17-Hydroxyprogesterone    Luteinizing hormone    Antimullerian hormone ([facility])     Encounter for weight loss counseling/obesity/hyperlipidemia/fatty liver disease: We are going to continue metformin increase the dose to 850 mg twice daily as she has had improvement in symptoms of irregular periods and hirsutism with metformin treatment.  She already has an appoint with endocrinology but is not until October.  Were going to check further lab workup for possible PCOS with FSH, prolactin, testosterone, DHEA, 17 hydroxyprogesterone, luteinizing hormone as well as antimalarial hormone.  Working to start the patient on phentermine to help with her weight loss and see her back in 1 month for recheck.  Risk versus benefits of this medication was discussed.  OARRS report was reviewed and appropriate    Irregular periods/hirsutism: She has symptoms of PCOS but states she was evaluated for this about 5 years ago with ultrasound and lab work both of which are not available for me to review and was negative at that time though she has improvement in the symptoms most recently with metformin that was instituted in January.  Lab work has been ordered for further evaluation and I have recommended that she also see endocrinology.  She should also have regular OB/GYN evaluation including cervical cancer screening.

## 2024-06-12 ENCOUNTER — LAB (OUTPATIENT)
Dept: LAB | Facility: LAB | Age: 34
End: 2024-06-12
Payer: COMMERCIAL

## 2024-06-12 DIAGNOSIS — E66.01 CLASS 3 SEVERE OBESITY DUE TO EXCESS CALORIES WITH SERIOUS COMORBIDITY AND BODY MASS INDEX (BMI) OF 40.0 TO 44.9 IN ADULT (MULTI): ICD-10-CM

## 2024-06-12 DIAGNOSIS — N92.6 IRREGULAR PERIODS/MENSTRUAL CYCLES: ICD-10-CM

## 2024-06-12 DIAGNOSIS — L68.0 HIRSUTISM: ICD-10-CM

## 2024-06-12 DIAGNOSIS — E78.00 PURE HYPERCHOLESTEROLEMIA: ICD-10-CM

## 2024-06-12 LAB
ALBUMIN SERPL BCP-MCNC: 4.5 G/DL (ref 3.4–5)
ALP SERPL-CCNC: 71 U/L (ref 33–110)
ALT SERPL W P-5'-P-CCNC: 8 U/L (ref 7–45)
AST SERPL W P-5'-P-CCNC: 15 U/L (ref 9–39)
BILIRUB DIRECT SERPL-MCNC: 0.1 MG/DL (ref 0–0.3)
BILIRUB SERPL-MCNC: 0.8 MG/DL (ref 0–1.2)
CHOLEST SERPL-MCNC: 219 MG/DL (ref 0–199)
CHOLESTEROL/HDL RATIO: 5.1
FSH SERPL-ACNC: 4.8 IU/L
HDLC SERPL-MCNC: 43.3 MG/DL
LDLC SERPL CALC-MCNC: 159 MG/DL
LH SERPL-ACNC: 2.7 IU/L
NON HDL CHOLESTEROL: 176 MG/DL (ref 0–149)
PROLACTIN SERPL-MCNC: 7.1 UG/L (ref 3–20)
PROT SERPL-MCNC: 7.2 G/DL (ref 6.4–8.2)
TRIGL SERPL-MCNC: 82 MG/DL (ref 0–149)
VLDL: 16 MG/DL (ref 0–40)

## 2024-06-12 PROCEDURE — 80061 LIPID PANEL: CPT

## 2024-06-12 PROCEDURE — 82626 DEHYDROEPIANDROSTERONE: CPT

## 2024-06-12 PROCEDURE — 36415 COLL VENOUS BLD VENIPUNCTURE: CPT

## 2024-06-12 PROCEDURE — 84146 ASSAY OF PROLACTIN: CPT

## 2024-06-12 PROCEDURE — 80076 HEPATIC FUNCTION PANEL: CPT

## 2024-06-12 PROCEDURE — 83001 ASSAY OF GONADOTROPIN (FSH): CPT

## 2024-06-12 PROCEDURE — 83498 ASY HYDROXYPROGESTERONE 17-D: CPT

## 2024-06-12 PROCEDURE — 83516 IMMUNOASSAY NONANTIBODY: CPT

## 2024-06-12 PROCEDURE — 84402 ASSAY OF FREE TESTOSTERONE: CPT

## 2024-06-12 PROCEDURE — 83002 ASSAY OF GONADOTROPIN (LH): CPT

## 2024-06-15 LAB — MIS SERPL-MCNC: 1.91 NG/ML (ref 0.18–11.71)

## 2024-06-16 LAB
TESTOSTERONE FREE (CHAN): 3.2 PG/ML (ref 0.1–6.4)
TESTOSTERONE,TOTAL,LC-MS/MS: 23 NG/DL (ref 2–45)

## 2024-06-17 ENCOUNTER — TELEPHONE (OUTPATIENT)
Dept: PRIMARY CARE | Facility: CLINIC | Age: 34
End: 2024-06-17
Payer: COMMERCIAL

## 2024-06-17 LAB — DHEA SERPL-MCNC: 2.57 NG/ML (ref 1.33–7.78)

## 2024-06-17 NOTE — TELEPHONE ENCOUNTER
----- Message from Luis Armando Alves, DO sent at 6/17/2024 10:03 AM EDT -----  Please let patient know that the hormone levels we have ordered so far have came back normal that includes testosterone And female hormones.

## 2024-06-19 ENCOUNTER — APPOINTMENT (OUTPATIENT)
Dept: PRIMARY CARE | Facility: CLINIC | Age: 34
End: 2024-06-19
Payer: COMMERCIAL

## 2024-06-19 VITALS
HEIGHT: 63 IN | WEIGHT: 228 LBS | HEART RATE: 87 BPM | BODY MASS INDEX: 40.4 KG/M2 | DIASTOLIC BLOOD PRESSURE: 84 MMHG | TEMPERATURE: 97.6 F | SYSTOLIC BLOOD PRESSURE: 119 MMHG

## 2024-06-19 DIAGNOSIS — K76.0 FATTY LIVER DISEASE, NONALCOHOLIC: Primary | ICD-10-CM

## 2024-06-19 DIAGNOSIS — Z71.3 ENCOUNTER FOR WEIGHT LOSS COUNSELING: ICD-10-CM

## 2024-06-19 DIAGNOSIS — E78.2 MIXED HYPERLIPIDEMIA: ICD-10-CM

## 2024-06-19 DIAGNOSIS — E66.01 CLASS 3 SEVERE OBESITY DUE TO EXCESS CALORIES WITH SERIOUS COMORBIDITY AND BODY MASS INDEX (BMI) OF 40.0 TO 44.9 IN ADULT (MULTI): ICD-10-CM

## 2024-06-19 DIAGNOSIS — L68.0 HIRSUTISM: ICD-10-CM

## 2024-06-19 DIAGNOSIS — N92.6 IRREGULAR PERIODS/MENSTRUAL CYCLES: ICD-10-CM

## 2024-06-19 LAB — 17OHP SERPL-MCNC: 25.49 NG/DL

## 2024-06-19 PROCEDURE — 99213 OFFICE O/P EST LOW 20 MIN: CPT | Performed by: INTERNAL MEDICINE

## 2024-06-19 PROCEDURE — 3008F BODY MASS INDEX DOCD: CPT | Performed by: INTERNAL MEDICINE

## 2024-06-19 PROCEDURE — 1036F TOBACCO NON-USER: CPT | Performed by: INTERNAL MEDICINE

## 2024-06-19 RX ORDER — PHENTERMINE HYDROCHLORIDE 37.5 MG/1
37.5 TABLET ORAL
Qty: 30 TABLET | Refills: 0 | Status: SHIPPED | OUTPATIENT
Start: 2024-06-19 | End: 2024-07-19

## 2024-06-19 ASSESSMENT — ENCOUNTER SYMPTOMS
DIZZINESS: 0
AGITATION: 0
ABDOMINAL PAIN: 0
COUGH: 0
SHORTNESS OF BREATH: 0
NAUSEA: 0
DIARRHEA: 0
VOMITING: 0
PALPITATIONS: 0
CHILLS: 0
BLOOD IN STOOL: 0
CONSTIPATION: 0
LIGHT-HEADEDNESS: 0
SORE THROAT: 0
FEVER: 0

## 2024-06-19 ASSESSMENT — PATIENT HEALTH QUESTIONNAIRE - PHQ9
2. FEELING DOWN, DEPRESSED OR HOPELESS: NOT AT ALL
1. LITTLE INTEREST OR PLEASURE IN DOING THINGS: NOT AT ALL
SUM OF ALL RESPONSES TO PHQ9 QUESTIONS 1 AND 2: 0

## 2024-06-19 NOTE — PROGRESS NOTES
"Subjective   Patient ID: Mallika Gutiérrez is a 34 y.o. female who presents for Follow-up (Weight loss).    HPI  Patient presents for weight loss management today.  Patient is doing well on phentermine.  She is lost 4 pounds since her last office visit.  She is tolerating medication without adverse effects.  No increase in anxiety.  She is also on metformin we have increased dose of this medication she is not having any adverse effects from the medication.  We have done stents of blood work For further evaluation of hirsutism and possible polycystic ovarian syndrome thus far her FSH prolactin level testosterone DHEA luteinizing hormone and antimalarial hormone levels have been normal.  Progesterone levels are pending.  She has appointment scheduled with endocrinology in October.  Patient denies any fever, chills, chest pain or shortness of breath, nausea or vomiting.  OARRS report is reviewed and appropriate.  She has upcoming appoint with her primary care physician to discuss hyperlipidemia.  I think it is reasonable to consider starting her on lipid-lowering medication based upon her risk factors for cardiovascular disease.    Review of Systems   Constitutional:  Negative for chills and fever.   HENT:  Negative for sore throat.    Eyes:  Negative for visual disturbance.   Respiratory:  Negative for cough and shortness of breath.    Cardiovascular:  Negative for chest pain, palpitations and leg swelling.   Gastrointestinal:  Negative for abdominal pain, blood in stool, constipation, diarrhea, nausea and vomiting.   Skin:  Negative for rash.   Neurological:  Negative for dizziness, syncope and light-headedness.   Psychiatric/Behavioral:  Negative for agitation.        Objective   /84   Pulse 87   Temp 36.4 °C (97.6 °F)   Ht 1.6 m (5' 3\")   Wt 103 kg (228 lb)   BMI 40.39 kg/m²     Physical Exam  Vitals and nursing note reviewed.   Constitutional:       General: She is not in acute distress.     Appearance: " Normal appearance. She is obese. She is not ill-appearing, toxic-appearing or diaphoretic.   HENT:      Head: Normocephalic and atraumatic.      Mouth/Throat:      Mouth: Mucous membranes are moist.      Pharynx: Oropharynx is clear. No oropharyngeal exudate.   Eyes:      Pupils: Pupils are equal, round, and reactive to light.   Cardiovascular:      Rate and Rhythm: Normal rate and regular rhythm.      Heart sounds: Normal heart sounds.   Pulmonary:      Effort: Pulmonary effort is normal. No respiratory distress.      Breath sounds: Normal breath sounds. No wheezing, rhonchi or rales.   Musculoskeletal:      Cervical back: Neck supple. No tenderness.      Right lower leg: No edema.      Left lower leg: No edema.   Lymphadenopathy:      Cervical: No cervical adenopathy.   Skin:     General: Skin is warm and dry.      Coloration: Skin is not jaundiced.      Findings: No rash.   Neurological:      General: No focal deficit present.      Mental Status: She is alert and oriented to person, place, and time.   Psychiatric:         Mood and Affect: Mood normal.         Behavior: Behavior normal.         Thought Content: Thought content normal.         Judgment: Judgment normal.         Assessment/Plan   Problem List Items Addressed This Visit             ICD-10-CM    Encounter for weight loss counseling Z71.3    Relevant Medications    phentermine (Adipex-P) 37.5 mg tablet    Class 3 severe obesity due to excess calories with serious comorbidity and body mass index (BMI) of 40.0 to 44.9 in adult (Multi) E66.01, Z68.41    Relevant Medications    phentermine (Adipex-P) 37.5 mg tablet    Fatty liver disease, nonalcoholic - Primary K76.0    Mixed hyperlipidemia E78.2    Hirsutism L68.0    Irregular periods/menstrual cycles N92.6     Encounter for weight loss counseling/class III obesity: She is doing well with phentermine and metformin they will be continued at this time.  Follow-up in 1 month for recheck.  She is lost 4  pounds in the last month.  No adverse effects from the medication.  Normal heart rate and blood pressure in the office today.    Nonalcoholic fatty liver disease: We discussed weight loss for treatment of this and will consider GLP-1 treatment depending on endocrinology evaluation in the fall.  Also she would likely benefit from cholesterol-lowering therapy which has been shown to slow the progression of fatty liver disease.    Mixed hyperlipidemia: She has follow-up with her regular primary care physician to discuss starting cholesterol-lowering medication.    Hirsutism/irregular periods: Improved on metformin.  So far hormone levels checked have been normal.  I advised further evaluation of the symptoms with her OB/GYN and endocrinology.

## 2024-06-26 ENCOUNTER — APPOINTMENT (OUTPATIENT)
Dept: PRIMARY CARE | Facility: CLINIC | Age: 34
End: 2024-06-26
Payer: COMMERCIAL

## 2024-07-03 ENCOUNTER — APPOINTMENT (OUTPATIENT)
Dept: PRIMARY CARE | Facility: CLINIC | Age: 34
End: 2024-07-03
Payer: COMMERCIAL

## 2024-07-10 ENCOUNTER — APPOINTMENT (OUTPATIENT)
Dept: PRIMARY CARE | Facility: CLINIC | Age: 34
End: 2024-07-10
Payer: COMMERCIAL

## 2024-07-10 VITALS
HEART RATE: 86 BPM | HEIGHT: 63 IN | OXYGEN SATURATION: 99 % | DIASTOLIC BLOOD PRESSURE: 82 MMHG | WEIGHT: 229 LBS | BODY MASS INDEX: 40.57 KG/M2 | SYSTOLIC BLOOD PRESSURE: 118 MMHG

## 2024-07-10 DIAGNOSIS — E78.00 PURE HYPERCHOLESTEROLEMIA: Primary | ICD-10-CM

## 2024-07-10 DIAGNOSIS — E66.01 CLASS 3 SEVERE OBESITY DUE TO EXCESS CALORIES WITH SERIOUS COMORBIDITY AND BODY MASS INDEX (BMI) OF 40.0 TO 44.9 IN ADULT (MULTI): ICD-10-CM

## 2024-07-10 DIAGNOSIS — F32.A CHRONIC DEPRESSION: ICD-10-CM

## 2024-07-10 DIAGNOSIS — K76.0 FATTY LIVER DISEASE, NONALCOHOLIC: ICD-10-CM

## 2024-07-10 DIAGNOSIS — F51.01 PRIMARY INSOMNIA: ICD-10-CM

## 2024-07-10 PROCEDURE — 1036F TOBACCO NON-USER: CPT | Performed by: INTERNAL MEDICINE

## 2024-07-10 PROCEDURE — 3008F BODY MASS INDEX DOCD: CPT | Performed by: INTERNAL MEDICINE

## 2024-07-10 PROCEDURE — 99214 OFFICE O/P EST MOD 30 MIN: CPT | Performed by: INTERNAL MEDICINE

## 2024-07-10 RX ORDER — ROSUVASTATIN CALCIUM 20 MG/1
20 TABLET, COATED ORAL DAILY
Qty: 90 TABLET | Refills: 1 | Status: SHIPPED | OUTPATIENT
Start: 2024-07-10 | End: 2025-01-06

## 2024-07-10 ASSESSMENT — ENCOUNTER SYMPTOMS
DEPRESSION: 0
LOSS OF SENSATION IN FEET: 0
OCCASIONAL FEELINGS OF UNSTEADINESS: 0

## 2024-07-10 NOTE — PROGRESS NOTES
Internal Medicine Outpatient Visit  Chief Complaint   Patient presents with    Follow-up     6 month follow up        HPI: Mallika Gutiérrez is of 34 y.o. who is here for Internal Visit for the following issues:  Patient is seen office today for follow-up of her medical problems including hyperlipidemia, insomnia, depression, obesity.  Patient has seen Dr. Alves for weight loss and she is on phentermine and has managed to lose satisfactory amount of weight so far.  She wants to discuss the result of blood test which was done last month the blood test showed that LDL is still very high at 159.  Now she is willing to be started on medicine.  Denies any chest pain or palpitation.  Has some shortness of breath during exercise.  Denies any nausea matting pain abdomen.  She has no urinary symptoms.  She has no weakness of any extremity.  Patient is not planning any pregnancy.  She is not sexually active at this time.  The question was discussed because of the possibility of her starting on the statins.  Patient does understand and will verbalized understanding.  Review of other systems are negative.    History reviewed. No pertinent surgical history.    Family History   Problem Relation Name Age of Onset    Thyroid cancer Mother      Other (left ventrical enlargement) Mother      Other (degentive disc disease) Sister      Other (degentive disc disease) Brother      Heart failure Maternal Grandmother      Lung cancer Maternal Grandfather      COPD Maternal Grandfather      Other (degentive dics disease) Maternal Grandfather           Current Outpatient Medications on File Prior to Visit   Medication Sig Dispense Refill    escitalopram (Lexapro) 10 mg tablet Take 1 tablet (10 mg) by mouth once daily.      lamoTRIgine (LaMICtal) 100 mg tablet Take 1 tablet (100 mg) by mouth once daily at bedtime.      metFORMIN (Glucophage) 850 mg tablet Take 1 tablet (850 mg) by mouth 2 times daily (morning and late afternoon). 180 tablet 3  "   phentermine (Adipex-P) 37.5 mg tablet Take 1 tablet (37.5 mg) by mouth once daily in the morning. Take before meals. 30 tablet 0    traZODone (Desyrel) 100 mg tablet Take 1 tablet (100 mg) by mouth as needed at bedtime for sleep.       No current facility-administered medications on file prior to visit.       Blood pressure 118/82, pulse 86, height 1.6 m (5' 3\"), weight 104 kg (229 lb), SpO2 99%.  Body mass index is 40.57 kg/m².    Examination:  Eyes: There is no pallor or jaundice  Oral cavity throat normal  Neck: There is no JVD or thyroid enlargement  Lungs: Clear to auscultation  CVS: Heart sounds are regular there is no gallop murmur  Abdomen: Normal exam  CNS: Normal power  Legs: No edema  Skin: No rash.    Assessment and plan    1. Pure hypercholesterolemia  After detailed discussion decided to start her on rosuvastatin.  Contraindication during pregnancy is discussed with the patient.  Advised to stop if she plans any pregnancy.  Patient has no plan at this time according to her.  She understands the instruction.  Side effects are discussed with the patient in detail.  Will have a repeat lipid panel in 6 weeks after starting the medication.  - Lipid panel; Future  - Hepatic function panel; Future  - rosuvastatin (Crestor) 20 mg tablet; Take 1 tablet (20 mg) by mouth once daily.  Dispense: 90 tablet; Refill: 1    2. Fatty liver disease, nonalcoholic  Recent liver function test is normal.  Will continue to monitor    3. Class 3 severe obesity due to excess calories with serious comorbidity and body mass index (BMI) of 40.0 to 44.9 in adult (Multi)  Patient is on phentermine and making progress with the weight loss.  Patient is being followed by Dr. Alves    4. Primary insomnia  Continue trazodone    5. Chronic depression  Stable on Lexapro.                   "

## 2024-07-17 ENCOUNTER — APPOINTMENT (OUTPATIENT)
Dept: PRIMARY CARE | Facility: CLINIC | Age: 34
End: 2024-07-17
Payer: COMMERCIAL

## 2024-07-17 VITALS
HEART RATE: 70 BPM | TEMPERATURE: 98 F | WEIGHT: 228 LBS | SYSTOLIC BLOOD PRESSURE: 114 MMHG | DIASTOLIC BLOOD PRESSURE: 82 MMHG | HEIGHT: 63 IN | BODY MASS INDEX: 40.4 KG/M2

## 2024-07-17 DIAGNOSIS — E78.2 MIXED HYPERLIPIDEMIA: Primary | ICD-10-CM

## 2024-07-17 DIAGNOSIS — K76.0 FATTY LIVER DISEASE, NONALCOHOLIC: ICD-10-CM

## 2024-07-17 DIAGNOSIS — Z71.3 ENCOUNTER FOR WEIGHT LOSS COUNSELING: ICD-10-CM

## 2024-07-17 DIAGNOSIS — E66.01 CLASS 3 SEVERE OBESITY DUE TO EXCESS CALORIES WITH SERIOUS COMORBIDITY AND BODY MASS INDEX (BMI) OF 40.0 TO 44.9 IN ADULT (MULTI): ICD-10-CM

## 2024-07-17 PROCEDURE — 99213 OFFICE O/P EST LOW 20 MIN: CPT | Performed by: INTERNAL MEDICINE

## 2024-07-17 PROCEDURE — 1036F TOBACCO NON-USER: CPT | Performed by: INTERNAL MEDICINE

## 2024-07-17 PROCEDURE — 3008F BODY MASS INDEX DOCD: CPT | Performed by: INTERNAL MEDICINE

## 2024-07-17 ASSESSMENT — ENCOUNTER SYMPTOMS
VOMITING: 0
DIARRHEA: 0
AGITATION: 0
OCCASIONAL FEELINGS OF UNSTEADINESS: 0
CONSTIPATION: 1
NAUSEA: 0
DEPRESSION: 0
COUGH: 0
FEVER: 0
CHILLS: 0
LOSS OF SENSATION IN FEET: 0
SHORTNESS OF BREATH: 0
DYSURIA: 0
ABDOMINAL PAIN: 0

## 2024-07-17 ASSESSMENT — PATIENT HEALTH QUESTIONNAIRE - PHQ9
1. LITTLE INTEREST OR PLEASURE IN DOING THINGS: NOT AT ALL
SUM OF ALL RESPONSES TO PHQ9 QUESTIONS 1 AND 2: 0
2. FEELING DOWN, DEPRESSED OR HOPELESS: NOT AT ALL

## 2024-07-17 NOTE — PROGRESS NOTES
"Subjective   Patient ID: Mallika Gutiérrez is a 34 y.o. female who presents for Weight Management (constipation).    HPI   patient presents to the office today for weight loss management.  She has just finished her second month of phentermine.  She did not lose any weight over this last month and developed constipation from the medication.  States she has had severe constipation related to the phentermine.  She would like to stop the medication at this time.  She is interested in Wegovy and Zepbound for weight loss.  She has already been employing a weight loss management plan with calorie restriction and exercise.  She has already been on metformin and phentermine.  At this time for additional weight loss management is indicated and patient would benefit from Wegovy years at [facility] for continued weight loss management and treatment of comorbidities associated with obesity that includes fatty liver disease as well as hyperlipidemia.    Review of Systems   Constitutional:  Negative for chills and fever.   Eyes:  Negative for visual disturbance.   Respiratory:  Negative for cough and shortness of breath.    Cardiovascular:  Negative for chest pain.   Gastrointestinal:  Positive for constipation. Negative for abdominal pain, diarrhea, nausea and vomiting.   Genitourinary:  Negative for dysuria.   Skin:  Negative for rash.   Psychiatric/Behavioral:  Negative for agitation.        Objective   /82   Pulse 70   Temp 36.7 °C (98 °F) (Temporal)   Ht 1.6 m (5' 3\")   Wt 103 kg (228 lb)   BMI 40.39 kg/m²     Physical Exam  Vitals and nursing note reviewed.   Constitutional:       General: She is not in acute distress.     Appearance: Normal appearance. She is obese. She is not ill-appearing, toxic-appearing or diaphoretic.   HENT:      Head: Normocephalic and atraumatic.      Mouth/Throat:      Mouth: Mucous membranes are moist.      Pharynx: Oropharynx is clear. No oropharyngeal exudate.   Eyes:      Pupils: Pupils are " equal, round, and reactive to light.   Cardiovascular:      Rate and Rhythm: Normal rate and regular rhythm.      Heart sounds: Normal heart sounds.   Pulmonary:      Effort: Pulmonary effort is normal. No respiratory distress.      Breath sounds: Normal breath sounds. No wheezing, rhonchi or rales.   Abdominal:      General: There is no distension.      Palpations: Abdomen is soft. There is no mass.      Tenderness: There is no abdominal tenderness.   Musculoskeletal:      Right lower leg: No edema.      Left lower leg: No edema.   Skin:     General: Skin is warm and dry.      Findings: No rash.   Neurological:      General: No focal deficit present.      Mental Status: She is alert and oriented to person, place, and time.   Psychiatric:         Mood and Affect: Mood normal.         Behavior: Behavior normal.         Thought Content: Thought content normal.         Judgment: Judgment normal.         Assessment/Plan   Problem List Items Addressed This Visit             ICD-10-CM    Encounter for weight loss counseling Z71.3    Relevant Medications    tirzepatide, weight loss, (Zepbound) 2.5 mg/0.5 mL injection    Class 3 severe obesity due to excess calories with serious comorbidity and body mass index (BMI) of 40.0 to 44.9 in adult (Multi) E66.01, Z68.41    Relevant Medications    tirzepatide, weight loss, (Zepbound) 2.5 mg/0.5 mL injection    Fatty liver disease, nonalcoholic K76.0    Relevant Medications    tirzepatide, weight loss, (Zepbound) 2.5 mg/0.5 mL injection    Mixed hyperlipidemia - Primary E78.2    Relevant Medications    tirzepatide, weight loss, (Zepbound) 2.5 mg/0.5 mL injection     Counter for weight loss counseling/obesity/fatty liver disease/hyperlipidemia: Patient was intolerant to phentermine we will start her on Zepbound once weekly injection.  She will touch base with us via [employer] message in 1 month to let us know how she is tolerating the medication and tolerating it well we will increase  the dose to 5 mg and continue to titrate up to get the appropriate response and see her back in 3 months and office visit for recheck.  Patient agreeable to this plan.

## 2024-07-31 DIAGNOSIS — E78.2 MIXED HYPERLIPIDEMIA: ICD-10-CM

## 2024-07-31 DIAGNOSIS — E66.01 CLASS 3 SEVERE OBESITY DUE TO EXCESS CALORIES WITH SERIOUS COMORBIDITY AND BODY MASS INDEX (BMI) OF 40.0 TO 44.9 IN ADULT (MULTI): Primary | ICD-10-CM

## 2024-07-31 DIAGNOSIS — K76.0 FATTY LIVER DISEASE, NONALCOHOLIC: ICD-10-CM

## 2024-08-14 ENCOUNTER — LAB (OUTPATIENT)
Dept: LAB | Facility: LAB | Age: 34
End: 2024-08-14
Payer: COMMERCIAL

## 2024-08-14 DIAGNOSIS — E78.00 PURE HYPERCHOLESTEROLEMIA: ICD-10-CM

## 2024-08-14 LAB
ALBUMIN SERPL BCP-MCNC: 4.1 G/DL (ref 3.4–5)
ALP SERPL-CCNC: 77 U/L (ref 33–110)
ALT SERPL W P-5'-P-CCNC: 6 U/L (ref 7–45)
AST SERPL W P-5'-P-CCNC: 11 U/L (ref 9–39)
BILIRUB DIRECT SERPL-MCNC: 0.2 MG/DL (ref 0–0.3)
BILIRUB SERPL-MCNC: 0.7 MG/DL (ref 0–1.2)
CHOLEST SERPL-MCNC: 119 MG/DL (ref 0–199)
CHOLESTEROL/HDL RATIO: 3.3
HDLC SERPL-MCNC: 36.2 MG/DL
LDLC SERPL CALC-MCNC: 67 MG/DL
NON HDL CHOLESTEROL: 83 MG/DL (ref 0–149)
PROT SERPL-MCNC: 6.9 G/DL (ref 6.4–8.2)
TRIGL SERPL-MCNC: 80 MG/DL (ref 0–149)
VLDL: 16 MG/DL (ref 0–40)

## 2024-08-14 PROCEDURE — 36415 COLL VENOUS BLD VENIPUNCTURE: CPT

## 2024-08-14 PROCEDURE — 80061 LIPID PANEL: CPT

## 2024-08-14 PROCEDURE — 80076 HEPATIC FUNCTION PANEL: CPT

## 2024-10-16 ENCOUNTER — APPOINTMENT (OUTPATIENT)
Dept: PRIMARY CARE | Facility: CLINIC | Age: 34
End: 2024-10-16
Payer: COMMERCIAL

## 2024-10-16 VITALS
TEMPERATURE: 98.2 F | DIASTOLIC BLOOD PRESSURE: 79 MMHG | BODY MASS INDEX: 38.48 KG/M2 | HEART RATE: 83 BPM | HEIGHT: 63 IN | WEIGHT: 217.2 LBS | SYSTOLIC BLOOD PRESSURE: 113 MMHG

## 2024-10-16 DIAGNOSIS — K76.0 FATTY LIVER DISEASE, NONALCOHOLIC: ICD-10-CM

## 2024-10-16 DIAGNOSIS — R09.81 NASAL CONGESTION: ICD-10-CM

## 2024-10-16 DIAGNOSIS — E66.813 CLASS 3 SEVERE OBESITY DUE TO EXCESS CALORIES WITH SERIOUS COMORBIDITY AND BODY MASS INDEX (BMI) OF 40.0 TO 44.9 IN ADULT: ICD-10-CM

## 2024-10-16 DIAGNOSIS — R05.9 COUGH, UNSPECIFIED TYPE: ICD-10-CM

## 2024-10-16 DIAGNOSIS — L68.0 HIRSUTISM: ICD-10-CM

## 2024-10-16 DIAGNOSIS — Z71.3 ENCOUNTER FOR WEIGHT LOSS COUNSELING: Primary | ICD-10-CM

## 2024-10-16 DIAGNOSIS — J34.89 RHINORRHEA: ICD-10-CM

## 2024-10-16 DIAGNOSIS — E66.01 CLASS 3 SEVERE OBESITY DUE TO EXCESS CALORIES WITH SERIOUS COMORBIDITY AND BODY MASS INDEX (BMI) OF 40.0 TO 44.9 IN ADULT: ICD-10-CM

## 2024-10-16 LAB — POC SARS-COV-2 AG BINAX: NORMAL

## 2024-10-16 PROCEDURE — 99213 OFFICE O/P EST LOW 20 MIN: CPT | Performed by: INTERNAL MEDICINE

## 2024-10-16 PROCEDURE — 1036F TOBACCO NON-USER: CPT | Performed by: INTERNAL MEDICINE

## 2024-10-16 PROCEDURE — 3008F BODY MASS INDEX DOCD: CPT | Performed by: INTERNAL MEDICINE

## 2024-10-16 PROCEDURE — 87811 SARS-COV-2 COVID19 W/OPTIC: CPT | Performed by: INTERNAL MEDICINE

## 2024-10-16 RX ORDER — METFORMIN HYDROCHLORIDE 500 MG/1
500 TABLET, EXTENDED RELEASE ORAL
Qty: 180 TABLET | Refills: 3 | Status: SHIPPED | OUTPATIENT
Start: 2024-10-16 | End: 2025-10-16

## 2024-10-16 ASSESSMENT — ENCOUNTER SYMPTOMS
COUGH: 0
DIARRHEA: 1
FEVER: 0
DIZZINESS: 0
RHINORRHEA: 1
ABDOMINAL PAIN: 0
AGITATION: 0
SORE THROAT: 1
SHORTNESS OF BREATH: 0
CHILLS: 0
NAUSEA: 0
VOMITING: 0
PALPITATIONS: 0
LIGHT-HEADEDNESS: 0

## 2024-10-16 NOTE — PROGRESS NOTES
"Subjective   Patient ID: Mallika Gutiérrez is a 34 y.o. female who presents for Follow-up (Follow up on Zepbound.//Stuffy nose, cough, left ear congestion, post nasal drip.  Sx's started Monday).    HPI She feels like she has tapered off on appetite suppression with current dose of Zepbound. Immediate release metformin was causing diarrhea after the dose increase to 850mg. She is interested in trying immediate release metformin.  She is interested in increasing the dose of Zepbound.  She has an appointment with endocrinology scheduled for later next week.    Patient developed symptoms of runny nose, sneezing, nose drain to back of throat, sore throat. Patient was on a hay ride the day before symptoms developed.  Patient thinks her allergies may be bothering her.  She took a COVID-19 test in our office today which was negative.  Denies any fevers or chills.  No chest pain or shortness of breath.    Review of Systems   Constitutional:  Negative for chills and fever.   HENT:  Positive for postnasal drip, rhinorrhea, sneezing and sore throat. Negative for ear pain.    Eyes:  Negative for visual disturbance.   Respiratory:  Negative for cough and shortness of breath.    Cardiovascular:  Negative for chest pain, palpitations and leg swelling.   Gastrointestinal:  Positive for diarrhea (Diarrhea from metformin). Negative for abdominal pain, nausea and vomiting.   Skin:  Negative for rash.   Neurological:  Negative for dizziness and light-headedness.   Psychiatric/Behavioral:  Negative for agitation.        Objective   /79 (BP Location: Left arm, Patient Position: Sitting, BP Cuff Size: Adult)   Pulse 83   Temp 36.8 °C (98.2 °F)   Ht 1.6 m (5' 3\")   Wt 98.5 kg (217 lb 3.2 oz)   BMI 38.48 kg/m²     Physical Exam  Vitals and nursing note reviewed.   Constitutional:       General: She is not in acute distress.     Appearance: Normal appearance. She is obese. She is not ill-appearing, toxic-appearing or diaphoretic. "   HENT:      Head: Normocephalic and atraumatic.      Right Ear: Tympanic membrane normal.      Left Ear: Tympanic membrane normal.      Nose: Congestion and rhinorrhea present.      Mouth/Throat:      Mouth: Mucous membranes are moist.      Pharynx: Oropharynx is clear. No oropharyngeal exudate.   Eyes:      Pupils: Pupils are equal, round, and reactive to light.   Cardiovascular:      Rate and Rhythm: Normal rate and regular rhythm.      Heart sounds: Normal heart sounds.   Pulmonary:      Effort: Pulmonary effort is normal. No respiratory distress.      Breath sounds: Normal breath sounds. No wheezing, rhonchi or rales.   Musculoskeletal:      Cervical back: Neck supple.      Right lower leg: No edema.      Left lower leg: No edema.   Lymphadenopathy:      Cervical: No cervical adenopathy.   Skin:     General: Skin is warm and dry.      Coloration: Skin is not jaundiced or pale.      Findings: No rash.   Neurological:      General: No focal deficit present.      Mental Status: She is alert and oriented to person, place, and time.      Cranial Nerves: No cranial nerve deficit.   Psychiatric:         Mood and Affect: Mood normal.         Behavior: Behavior normal.         Thought Content: Thought content normal.         Judgment: Judgment normal.         Assessment/Plan   Problem List Items Addressed This Visit             ICD-10-CM    Encounter for weight loss counseling - Primary Z71.3    Relevant Medications    tirzepatide, weight loss, (Zepbound) 7.5 mg/0.5 mL injection    Class 3 severe obesity due to excess calories with serious comorbidity and body mass index (BMI) of 40.0 to 44.9 in adult E66.813, E66.01, Z68.41    Relevant Medications    tirzepatide, weight loss, (Zepbound) 7.5 mg/0.5 mL injection    Fatty liver disease, nonalcoholic K76.0    Relevant Medications    tirzepatide, weight loss, (Zepbound) 7.5 mg/0.5 mL injection    Hirsutism L68.0    Relevant Medications    metFORMIN  mg 24 hr tablet     tirzepatide, weight loss, (Zepbound) 7.5 mg/0.5 mL injection    Nasal congestion R09.81    Rhinorrhea J34.89    Cough R05.9    Relevant Orders    POCT BinaxNOW Covid-19 Ag Card manually resulted     Encounter for weight loss counseling/fatty liver disease nonalcoholic/hirsutism/obesity: Patient will continue on Zepbound we have increased the dose from 5 mg to 7.5 mg weekly.  Tolerating medication well without adverse effects.  She was having diarrhea from metformin so we switched it to the extended release version.    Cough/nasal congestion/rhinorrhea: We did do a COVID-19 test in the office that was negative.  Suspect her symptoms may be related to allergies as she was recently on a hay ride the day before the symptoms began versus acute URI that is viral.  We advise conservative treatment over-the-counter antihistamine, nasal spray.  If symptoms develops fever worsening symptoms to follow-up for recheck.

## 2024-10-22 ENCOUNTER — APPOINTMENT (OUTPATIENT)
Dept: ENDOCRINOLOGY | Facility: CLINIC | Age: 34
End: 2024-10-22
Payer: COMMERCIAL

## 2024-10-22 VITALS
WEIGHT: 220 LBS | DIASTOLIC BLOOD PRESSURE: 70 MMHG | HEIGHT: 62 IN | SYSTOLIC BLOOD PRESSURE: 114 MMHG | BODY MASS INDEX: 40.48 KG/M2

## 2024-10-22 DIAGNOSIS — Z80.8 FAMILY HISTORY OF THYROID CANCER: Primary | ICD-10-CM

## 2024-10-22 PROCEDURE — 99204 OFFICE O/P NEW MOD 45 MIN: CPT | Performed by: STUDENT IN AN ORGANIZED HEALTH CARE EDUCATION/TRAINING PROGRAM

## 2024-10-22 PROCEDURE — 3008F BODY MASS INDEX DOCD: CPT | Performed by: STUDENT IN AN ORGANIZED HEALTH CARE EDUCATION/TRAINING PROGRAM

## 2024-10-22 NOTE — PROGRESS NOTES
"Subjective   Patient ID: Mallika Gutiérrez is a 34 y.o. female who presents for OTHER (Referred by PCP , Dr Alves.  Family history both mom and sister [positive for thyroid cancer.  Pt denies any s/s. )   Lab Results   Component Value Date    TSH 0.95 12/27/2023      HPI  Patient is a 34-year-old female with family history of medullary thyroid cancer who presents for thyroid evaluation.  She states that she was started on Zepbound for weight loss through her PCP wanted to make sure she is on the safe side continuing on it.  She lost 25 pounds so far on Zepbound 7.5 mg  She does not have a personal history of thyroid nodules or goiter  Thyroid function is within normal    Her sister was diagnosed in her 20s with a thyroid mass pathology was medullary thyroid cancer.  Her mother and maternal aunt also had medullary thyroid cancer.  She is not aware of any genetic testing that was done.    Review of Systems    Objective   Physical Exam  Constitutional:       Appearance: Normal appearance.   Neck:      Thyroid: No thyroid mass.   Cardiovascular:      Rate and Rhythm: Normal rate and regular rhythm.   Pulmonary:      Effort: Pulmonary effort is normal.      Breath sounds: Normal breath sounds.   Neurological:      General: No focal deficit present.      Mental Status: She is alert.      Visit Vitals  /70   Ht 1.575 m (5' 2\")   Wt 99.8 kg (220 lb)   BMI 40.24 kg/m²   Smoking Status Never   BSA 2.09 m²        Assessment/Plan        Patient is a 34-year-old female with family history of medullary thyroid cancer who presents for thyroid evaluation while on Zepbound 7.5 mg for weight loss through PCP.  Given significant family history I recommend obtaining a thyroid ultrasound to evaluate for any thyroid nodules , low threshold for biopsy if needed.    Further recommendations pending above           "

## 2024-10-24 ENCOUNTER — HOSPITAL ENCOUNTER (OUTPATIENT)
Dept: RADIOLOGY | Facility: HOSPITAL | Age: 34
Discharge: HOME | End: 2024-10-24
Payer: COMMERCIAL

## 2024-10-24 DIAGNOSIS — Z80.8 FAMILY HISTORY OF THYROID CANCER: ICD-10-CM

## 2024-10-24 PROCEDURE — 76536 US EXAM OF HEAD AND NECK: CPT

## 2025-01-06 DIAGNOSIS — Z71.3 ENCOUNTER FOR WEIGHT LOSS COUNSELING: ICD-10-CM

## 2025-01-06 DIAGNOSIS — E66.813 CLASS 3 SEVERE OBESITY DUE TO EXCESS CALORIES WITH SERIOUS COMORBIDITY AND BODY MASS INDEX (BMI) OF 40.0 TO 44.9 IN ADULT: ICD-10-CM

## 2025-01-06 DIAGNOSIS — L68.0 HIRSUTISM: ICD-10-CM

## 2025-01-06 DIAGNOSIS — E66.01 CLASS 3 SEVERE OBESITY DUE TO EXCESS CALORIES WITH SERIOUS COMORBIDITY AND BODY MASS INDEX (BMI) OF 40.0 TO 44.9 IN ADULT: ICD-10-CM

## 2025-01-06 DIAGNOSIS — K76.0 FATTY LIVER DISEASE, NONALCOHOLIC: ICD-10-CM

## 2025-01-15 ENCOUNTER — APPOINTMENT (OUTPATIENT)
Dept: PRIMARY CARE | Facility: CLINIC | Age: 35
End: 2025-01-15
Payer: COMMERCIAL

## 2025-01-15 VITALS
TEMPERATURE: 97.9 F | HEART RATE: 72 BPM | BODY MASS INDEX: 38.87 KG/M2 | HEIGHT: 62 IN | DIASTOLIC BLOOD PRESSURE: 75 MMHG | WEIGHT: 211.2 LBS | SYSTOLIC BLOOD PRESSURE: 109 MMHG

## 2025-01-15 DIAGNOSIS — M25.50 DIFFUSE ARTHRALGIA: ICD-10-CM

## 2025-01-15 DIAGNOSIS — E78.2 MIXED HYPERLIPIDEMIA: ICD-10-CM

## 2025-01-15 DIAGNOSIS — E55.9 VITAMIN D DEFICIENCY: ICD-10-CM

## 2025-01-15 DIAGNOSIS — Z13.29 THYROID DISORDER SCREENING: ICD-10-CM

## 2025-01-15 DIAGNOSIS — E66.01 CLASS 2 SEVERE OBESITY DUE TO EXCESS CALORIES WITH SERIOUS COMORBIDITY AND BODY MASS INDEX (BMI) OF 38.0 TO 38.9 IN ADULT: ICD-10-CM

## 2025-01-15 DIAGNOSIS — E78.00 PURE HYPERCHOLESTEROLEMIA: Primary | ICD-10-CM

## 2025-01-15 DIAGNOSIS — Z13.1 DIABETES MELLITUS SCREENING: ICD-10-CM

## 2025-01-15 DIAGNOSIS — Z71.3 ENCOUNTER FOR WEIGHT LOSS COUNSELING: ICD-10-CM

## 2025-01-15 DIAGNOSIS — M25.572 CHRONIC PAIN OF BOTH ANKLES: ICD-10-CM

## 2025-01-15 DIAGNOSIS — M25.571 CHRONIC PAIN OF BOTH ANKLES: ICD-10-CM

## 2025-01-15 DIAGNOSIS — E66.812 CLASS 2 SEVERE OBESITY DUE TO EXCESS CALORIES WITH SERIOUS COMORBIDITY AND BODY MASS INDEX (BMI) OF 38.0 TO 38.9 IN ADULT: ICD-10-CM

## 2025-01-15 DIAGNOSIS — G89.29 CHRONIC PAIN OF BOTH ANKLES: ICD-10-CM

## 2025-01-15 PROCEDURE — 1036F TOBACCO NON-USER: CPT | Performed by: INTERNAL MEDICINE

## 2025-01-15 PROCEDURE — 99214 OFFICE O/P EST MOD 30 MIN: CPT | Performed by: INTERNAL MEDICINE

## 2025-01-15 PROCEDURE — 3008F BODY MASS INDEX DOCD: CPT | Performed by: INTERNAL MEDICINE

## 2025-01-15 RX ORDER — HYDROXYZINE HYDROCHLORIDE 10 MG/1
10 TABLET, FILM COATED ORAL DAILY PRN
COMMUNITY

## 2025-01-15 RX ORDER — MELOXICAM 15 MG/1
15 TABLET ORAL DAILY
Qty: 90 TABLET | Refills: 1 | Status: SHIPPED | OUTPATIENT
Start: 2025-01-15 | End: 2025-07-14

## 2025-01-15 RX ORDER — LAMOTRIGINE 150 MG/1
1 TABLET ORAL
COMMUNITY
Start: 2025-01-12

## 2025-01-15 RX ORDER — ROSUVASTATIN CALCIUM 20 MG/1
20 TABLET, COATED ORAL DAILY
Qty: 90 TABLET | Refills: 1 | Status: SHIPPED | OUTPATIENT
Start: 2025-01-15 | End: 2025-07-14

## 2025-01-15 ASSESSMENT — ENCOUNTER SYMPTOMS
SORE THROAT: 0
AGITATION: 0
SHORTNESS OF BREATH: 0
COUGH: 0
DIARRHEA: 0
ARTHRALGIAS: 1
NAUSEA: 0
ABDOMINAL PAIN: 0
CONFUSION: 0
MYALGIAS: 0
FEVER: 0
LIGHT-HEADEDNESS: 0
CONSTIPATION: 0
CHILLS: 0
DIZZINESS: 0
VOMITING: 0
DYSURIA: 0
BLOOD IN STOOL: 0

## 2025-01-15 NOTE — PROGRESS NOTES
Subjective   Patient ID: Mallika Gutiérrez is a 34 y.o. female who presents for Follow-up (On zepbound/Pains in both knees and ankles).    HPI Patient doing well on Tizepatide from a weight loss standpoint. There is no family history of medullary thyroid cancer. Patient has a history of papillary thyroid cancer in her mom and sister. Patient recently had thyroid ultrasound 10/24/2024 that was normal. Endocrinology advised no further workup. Patient's weight just before starting Tizepatide was 228 lbs on July 17, 2024 and weight today is 211 lbs.  She has lost approximately 6 lbs since our office visit in October. Patient denies adverse effects from the medication with the exception of slight itching at the injection site for one to two days after the shot.  She continues on exercise and weight loss plan with calorie restriction.      Patient complains of bilateral knee and ankle pain that she has noticed for three years. This year has been the worst. 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 She stopped playing in 2010. The pain is describes as an achy stiffness to the area. Denies muscle pain. Denies history of autoimmune disease.  She admits to swelling of the ankles but no redness. The ankle pain is more lateral.  Patient uses a heating pad for the symptoms.     Review of Systems   Constitutional:  Negative for chills and fever.   HENT:  Negative for sore throat.    Eyes:  Negative for visual disturbance.   Respiratory:  Negative for cough and shortness of breath.    Cardiovascular:  Negative for chest pain and leg swelling.   Gastrointestinal:  Negative for abdominal pain, blood in stool, constipation, diarrhea, nausea and vomiting.   Genitourinary:  Negative for dysuria.   Musculoskeletal:  Positive for arthralgias. Negative for myalgias.   Skin:  Negative for rash.   Neurological:  Negative for dizziness, syncope and  "light-headedness.   Psychiatric/Behavioral:  Negative for agitation and confusion.        Objective   /75 (BP Location: Left arm, Patient Position: Sitting, BP Cuff Size: Adult)   Pulse 72   Temp 36.6 °C (97.9 °F)   Ht 1.575 m (5' 2\")   Wt 95.8 kg (211 lb 3.2 oz)   BMI 38.63 kg/m²     Physical Exam  Vitals and nursing note reviewed.   Constitutional:       General: She is not in acute distress.     Appearance: Normal appearance. She is obese. She is not ill-appearing, toxic-appearing or diaphoretic.   HENT:      Head: Normocephalic and atraumatic.      Mouth/Throat:      Mouth: Mucous membranes are moist.      Pharynx: Oropharynx is clear. No oropharyngeal exudate.   Eyes:      Extraocular Movements: Extraocular movements intact.      Pupils: Pupils are equal, round, and reactive to light.   Cardiovascular:      Rate and Rhythm: Normal rate and regular rhythm.      Heart sounds: Normal heart sounds.   Pulmonary:      Effort: Pulmonary effort is normal. No respiratory distress.      Breath sounds: Normal breath sounds. No wheezing, rhonchi or rales.   Abdominal:      General: Bowel sounds are normal.      Palpations: Abdomen is soft.   Musculoskeletal:         General: No tenderness.      Right lower leg: No edema.      Left lower leg: No edema.   Skin:     General: Skin is warm and dry.      Coloration: Skin is not jaundiced or pale.      Findings: No rash.   Neurological:      General: No focal deficit present.      Mental Status: She is alert and oriented to person, place, and time.      Cranial Nerves: No cranial nerve deficit.      Motor: No weakness.   Psychiatric:         Mood and Affect: Mood normal.         Thought Content: Thought content normal.         Judgment: Judgment normal.         Assessment/Plan   Problem List Items Addressed This Visit             ICD-10-CM    Encounter for weight loss counseling Z71.3    Mixed hyperlipidemia E78.2    Chronic pain of both ankles M25.571, G89.29, M25.572 "    Relevant Orders    XR ankle bilateral 2 views    Class 2 severe obesity due to excess calories with serious comorbidity and body mass index (BMI) of 38.0 to 38.9 in adult E66.812, E66.01, Z68.38    Diabetes mellitus screening Z13.1    Relevant Orders    Hemoglobin A1C    Diffuse arthralgia M25.50    Relevant Medications    meloxicam (Mobic) 15 mg tablet    Other Relevant Orders    IVETT with Reflex to SHANNON    Rheumatoid factor    Sedimentation Rate    C-reactive protein    Uric acid    Thyroid disorder screening Z13.29    Relevant Orders    TSH with reflex to Free T4 if abnormal    Pure hypercholesterolemia - Primary E78.00    Relevant Medications    rosuvastatin (Crestor) 20 mg tablet    Other Relevant Orders    CBC and Auto Differential    Comprehensive metabolic panel    Lipid panel    Vitamin D deficiency E55.9    Relevant Orders    Vitamin D 25-Hydroxy,Total (for eval of Vitamin D levels)     Encounter for weight loss counseling/obesity/hyperlipidemia/fatty liver: She will continue on Zepbound at this time.  Patient doing well with weight loss at this time losing weight loss is appropriate rate.  No adverse effects from the medication.  She will follow-up in 3 months for recheck we will continue diet and exercise modification as discussed.    Hyperlipidemia: Chronic, controlled improved with her current medication regimen will recheck levels at this time    Chronic pain of both ankles/diffuse arthralgia: Will check labs with IVETT, uric acid, rheumatoid factor, ESR, CRP for further evaluation.  Will check bilateral x-rays of the ankles and start her on meloxicam.  Discussed the risk versus benefits of meloxicam medication advised to take the medication on and with food.    Thyroid disorder screening: Check a TSH level    Vitamin D deficiency: Check a vitamin D level

## 2025-01-16 ENCOUNTER — LAB (OUTPATIENT)
Dept: LAB | Facility: LAB | Age: 35
End: 2025-01-16
Payer: COMMERCIAL

## 2025-01-16 ENCOUNTER — HOSPITAL ENCOUNTER (OUTPATIENT)
Dept: RADIOLOGY | Facility: CLINIC | Age: 35
Discharge: HOME | End: 2025-01-16
Payer: COMMERCIAL

## 2025-01-16 DIAGNOSIS — E78.00 PURE HYPERCHOLESTEROLEMIA: ICD-10-CM

## 2025-01-16 DIAGNOSIS — M25.50 DIFFUSE ARTHRALGIA: ICD-10-CM

## 2025-01-16 DIAGNOSIS — M25.571 CHRONIC PAIN OF BOTH ANKLES: ICD-10-CM

## 2025-01-16 DIAGNOSIS — G89.29 CHRONIC PAIN OF BOTH ANKLES: ICD-10-CM

## 2025-01-16 DIAGNOSIS — Z13.29 THYROID DISORDER SCREENING: ICD-10-CM

## 2025-01-16 DIAGNOSIS — M25.572 CHRONIC PAIN OF BOTH ANKLES: ICD-10-CM

## 2025-01-16 DIAGNOSIS — Z13.1 DIABETES MELLITUS SCREENING: ICD-10-CM

## 2025-01-16 DIAGNOSIS — E55.9 VITAMIN D DEFICIENCY: ICD-10-CM

## 2025-01-16 LAB
25(OH)D3 SERPL-MCNC: 18 NG/ML (ref 30–100)
ALBUMIN SERPL BCP-MCNC: 4.3 G/DL (ref 3.4–5)
ALP SERPL-CCNC: 75 U/L (ref 33–110)
ALT SERPL W P-5'-P-CCNC: 9 U/L (ref 7–45)
ANION GAP SERPL CALC-SCNC: 14 MMOL/L (ref 10–20)
AST SERPL W P-5'-P-CCNC: 14 U/L (ref 9–39)
BASOPHILS # BLD AUTO: 0.05 X10*3/UL (ref 0–0.1)
BASOPHILS NFR BLD AUTO: 0.5 %
BILIRUB SERPL-MCNC: 0.6 MG/DL (ref 0–1.2)
BUN SERPL-MCNC: 18 MG/DL (ref 6–23)
CALCIUM SERPL-MCNC: 9.4 MG/DL (ref 8.6–10.3)
CHLORIDE SERPL-SCNC: 103 MMOL/L (ref 98–107)
CHOLEST SERPL-MCNC: 125 MG/DL (ref 0–199)
CHOLESTEROL/HDL RATIO: 3
CO2 SERPL-SCNC: 27 MMOL/L (ref 21–32)
CREAT SERPL-MCNC: 0.92 MG/DL (ref 0.5–1.05)
CRP SERPL-MCNC: 0.71 MG/DL
EGFRCR SERPLBLD CKD-EPI 2021: 83 ML/MIN/1.73M*2
EOSINOPHIL # BLD AUTO: 0.17 X10*3/UL (ref 0–0.7)
EOSINOPHIL NFR BLD AUTO: 1.8 %
ERYTHROCYTE [DISTWIDTH] IN BLOOD BY AUTOMATED COUNT: 12.6 % (ref 11.5–14.5)
ERYTHROCYTE [SEDIMENTATION RATE] IN BLOOD BY WESTERGREN METHOD: 18 MM/H (ref 0–20)
EST. AVERAGE GLUCOSE BLD GHB EST-MCNC: 94 MG/DL
GLUCOSE SERPL-MCNC: 81 MG/DL (ref 74–99)
HBA1C MFR BLD: 4.9 %
HCT VFR BLD AUTO: 39.8 % (ref 36–46)
HDLC SERPL-MCNC: 41.4 MG/DL
HGB BLD-MCNC: 12.9 G/DL (ref 12–16)
IMM GRANULOCYTES # BLD AUTO: 0.03 X10*3/UL (ref 0–0.7)
IMM GRANULOCYTES NFR BLD AUTO: 0.3 % (ref 0–0.9)
LDLC SERPL CALC-MCNC: 64 MG/DL
LYMPHOCYTES # BLD AUTO: 2.88 X10*3/UL (ref 1.2–4.8)
LYMPHOCYTES NFR BLD AUTO: 30.3 %
MCH RBC QN AUTO: 29.1 PG (ref 26–34)
MCHC RBC AUTO-ENTMCNC: 32.4 G/DL (ref 32–36)
MCV RBC AUTO: 90 FL (ref 80–100)
MONOCYTES # BLD AUTO: 0.49 X10*3/UL (ref 0.1–1)
MONOCYTES NFR BLD AUTO: 5.2 %
NEUTROPHILS # BLD AUTO: 5.87 X10*3/UL (ref 1.2–7.7)
NEUTROPHILS NFR BLD AUTO: 61.9 %
NON HDL CHOLESTEROL: 84 MG/DL (ref 0–149)
NRBC BLD-RTO: 0 /100 WBCS (ref 0–0)
PLATELET # BLD AUTO: 365 X10*3/UL (ref 150–450)
POTASSIUM SERPL-SCNC: 4.6 MMOL/L (ref 3.5–5.3)
PROT SERPL-MCNC: 6.8 G/DL (ref 6.4–8.2)
RBC # BLD AUTO: 4.43 X10*6/UL (ref 4–5.2)
RHEUMATOID FACT SER NEPH-ACNC: <10 IU/ML (ref 0–15)
SODIUM SERPL-SCNC: 139 MMOL/L (ref 136–145)
TRIGL SERPL-MCNC: 96 MG/DL (ref 0–149)
TSH SERPL-ACNC: 1.38 MIU/L (ref 0.44–3.98)
URATE SERPL-MCNC: 3.9 MG/DL (ref 2.3–6.7)
VLDL: 19 MG/DL (ref 0–40)
WBC # BLD AUTO: 9.5 X10*3/UL (ref 4.4–11.3)

## 2025-01-16 PROCEDURE — 86140 C-REACTIVE PROTEIN: CPT

## 2025-01-16 PROCEDURE — 84443 ASSAY THYROID STIM HORMONE: CPT

## 2025-01-16 PROCEDURE — 80053 COMPREHEN METABOLIC PANEL: CPT

## 2025-01-16 PROCEDURE — 84550 ASSAY OF BLOOD/URIC ACID: CPT

## 2025-01-16 PROCEDURE — 80061 LIPID PANEL: CPT

## 2025-01-16 PROCEDURE — 86038 ANTINUCLEAR ANTIBODIES: CPT

## 2025-01-16 PROCEDURE — 85025 COMPLETE CBC W/AUTO DIFF WBC: CPT

## 2025-01-16 PROCEDURE — 86431 RHEUMATOID FACTOR QUANT: CPT

## 2025-01-16 PROCEDURE — 85652 RBC SED RATE AUTOMATED: CPT

## 2025-01-16 PROCEDURE — 73610 X-RAY EXAM OF ANKLE: CPT | Mod: 50

## 2025-01-16 PROCEDURE — 83036 HEMOGLOBIN GLYCOSYLATED A1C: CPT

## 2025-01-16 PROCEDURE — 82306 VITAMIN D 25 HYDROXY: CPT

## 2025-01-17 LAB — ANA SER QL HEP2 SUBST: NEGATIVE

## 2025-02-25 ENCOUNTER — ANCILLARY PROCEDURE (OUTPATIENT)
Dept: URGENT CARE | Age: 35
End: 2025-02-25
Payer: COMMERCIAL

## 2025-02-25 ENCOUNTER — OFFICE VISIT (OUTPATIENT)
Dept: URGENT CARE | Age: 35
End: 2025-02-25
Payer: COMMERCIAL

## 2025-02-25 VITALS
BODY MASS INDEX: 38.83 KG/M2 | HEART RATE: 86 BPM | RESPIRATION RATE: 18 BRPM | SYSTOLIC BLOOD PRESSURE: 107 MMHG | DIASTOLIC BLOOD PRESSURE: 63 MMHG | TEMPERATURE: 97.8 F | OXYGEN SATURATION: 98 % | HEIGHT: 62 IN | WEIGHT: 211 LBS

## 2025-02-25 DIAGNOSIS — M25.522 LEFT ELBOW PAIN: ICD-10-CM

## 2025-02-25 DIAGNOSIS — G56.22 LESION OF LEFT ULNAR NERVE: Primary | ICD-10-CM

## 2025-02-25 PROCEDURE — 1036F TOBACCO NON-USER: CPT

## 2025-02-25 PROCEDURE — 3008F BODY MASS INDEX DOCD: CPT

## 2025-02-25 PROCEDURE — 99213 OFFICE O/P EST LOW 20 MIN: CPT

## 2025-02-25 PROCEDURE — 73080 X-RAY EXAM OF ELBOW: CPT | Mod: LEFT SIDE

## 2025-02-25 ASSESSMENT — PATIENT HEALTH QUESTIONNAIRE - PHQ9
1. LITTLE INTEREST OR PLEASURE IN DOING THINGS: NOT AT ALL
SUM OF ALL RESPONSES TO PHQ9 QUESTIONS 1 & 2: 0
2. FEELING DOWN, DEPRESSED OR HOPELESS: NOT AT ALL

## 2025-02-25 ASSESSMENT — PAIN SCALES - GENERAL: PAINLEVEL_OUTOF10: 5

## 2025-02-25 NOTE — PROGRESS NOTES
"Subjective   Patient ID: Mallika Gutiérrez is a 35 y.o. female. They present today with a chief complaint of Elbow Pain (Left elbow /Tingling, shooting pain with movement /Started yesterday ).    History of Present Illness  Subjective  Mallika Gutiérrez is a 35 y.o. female who presents with left elbow pain. Onset of the symptoms was yesterday. Inciting event: none known. Current symptoms include: pain radiating to the 4th and 5th digits of same hand and pain with extension. Pain is aggravated by: grasping, lifting heavy objects, supination/pronation as when opening doors. Symptoms have progressed to a point and plateaued. Patient has had no prior elbow problems. Evaluation to date: none. Treatment to date: avoidance of offending activity and OTC analgesics.    Review of Systems   HEENT/Neck: Denies head or neck injury/pain, extremity injury  Constitutional:  Denies fever, chills, malaise, fatigue    Musculoskeletal:  See HPI   Integumentary:  Denies redness, rash, wounds.    Neurologic:  Endorses paresthesia. Denies numbness, weakness.   All other systems are negative    Objective  /63 (BP Location: Left arm, Patient Position: Sitting)   Pulse 86   Temp 36.6 °C (97.8 °F)   Resp 18   Ht 1.575 m (5' 2\")   Wt 95.7 kg (211 lb)   LMP 02/12/2025 (Approximate)   SpO2 98%   BMI 38.59 kg/m²   Right elbow: without deformity and full active ROM,  strength 5/5  Left elbow:  without deformity, redness, warmth, edema of olecranon bursa, tenderness over lateral/medial epicondyles, nodules over ulnar nerve, signs of atrophy;  Negative Tinel, extension limited to 30 degrees by pain with active range of motion, and  strength 4/5 limited by pain.     X-ray left elbow: no fracture, dislocation, swelling or degenerative changes noted        History provided by:  Patient   used: No    Elbow Pain      Past Medical History  Allergies as of 02/25/2025 - Reviewed 02/25/2025   Allergen Reaction Noted    " "Cefaclor Anaphylaxis, Angioedema, and Swelling 06/11/2015    Gabapentin Swelling 07/17/2024    Phentermine Constipation 07/17/2024       (Not in a hospital admission)       Past Medical History:   Diagnosis Date    Fatty liver     about 3 or 4 years ago.       History reviewed. No pertinent surgical history.     reports that she has never smoked. She has never been exposed to tobacco smoke. She has never used smokeless tobacco. She reports that she does not currently use alcohol. She reports that she does not use drugs.    Review of Systems  Review of Systems                               Objective    Vitals:    02/25/25 1313   BP: 107/63   BP Location: Left arm   Patient Position: Sitting   Pulse: 86   Resp: 18   Temp: 36.6 °C (97.8 °F)   SpO2: 98%   Weight: 95.7 kg (211 lb)   Height: 1.575 m (5' 2\")     Patient's last menstrual period was 02/12/2025 (approximate).    Physical Exam  Vitals and nursing note reviewed.   Constitutional:       General: She is not in acute distress.     Appearance: Normal appearance. She is not ill-appearing, toxic-appearing or diaphoretic.   Cardiovascular:      Rate and Rhythm: Normal rate.      Pulses: Normal pulses.   Pulmonary:      Effort: Pulmonary effort is normal.   Musculoskeletal:      Right shoulder: Normal.      Left shoulder: Normal.      Right upper arm: Normal.      Left upper arm: Normal.      Cervical back: Normal range of motion. No rigidity.   Skin:     General: Skin is warm and dry.      Capillary Refill: Capillary refill takes less than 2 seconds.      Coloration: Skin is not jaundiced or pale.      Findings: No bruising, erythema or rash.   Neurological:      General: No focal deficit present.      Mental Status: She is alert and oriented to person, place, and time.      Sensory: No sensory deficit.      Coordination: Coordination normal.         Procedures    Point of Care Test & Imaging Results from this visit  No results found for this visit on 02/25/25.   No " results found.    Diagnostic study results (if any) were reviewed by MARKEL Quesada.    Assessment/Plan   Allergies, medications, history, and pertinent labs/EKGs/Imaging reviewed by MARKEL Quesada.     Medical Decision Making    Presents with acute onset left elbow paresthesia and pain that radiates along ulnar nerve to 4th and 5th digits with movement.  No signs of cellulitis, muscular atrophy, neurovascular compromise, deformity.  Xray left elbow normal. Suspect ulnar neuropathy as source of symptoms.  Discussed conservative measures including NSAIDs, splinting, ice, and use of pad for elbow when working.  Also discussed possible diagnostics that a specialist may order if no resolution.  Will refer to ortho and patient understands that conservative measures should be attempted first. She is agreeable with this plan.  Discussed when to seek emergent care.      Orders and Diagnoses  Diagnoses and all orders for this visit:  Left elbow pain  -     XR elbow left 3+ views; Future      Medical Admin Record      Patient disposition: Home    Electronically signed by MARKEL Quesada  1:32 PM

## 2025-02-27 DIAGNOSIS — E55.9 VITAMIN D DEFICIENCY: Primary | ICD-10-CM

## 2025-02-27 RX ORDER — VIT C/E/ZN/COPPR/LUTEIN/ZEAXAN 250MG-90MG
125 CAPSULE ORAL DAILY
Qty: 90 CAPSULE | Refills: 3 | Status: SHIPPED | OUTPATIENT
Start: 2025-02-27 | End: 2026-02-27

## 2025-04-02 ENCOUNTER — OFFICE VISIT (OUTPATIENT)
Dept: ORTHOPEDIC SURGERY | Facility: CLINIC | Age: 35
End: 2025-04-02
Payer: COMMERCIAL

## 2025-04-02 ENCOUNTER — HOSPITAL ENCOUNTER (OUTPATIENT)
Dept: RADIOLOGY | Facility: CLINIC | Age: 35
Discharge: HOME | End: 2025-04-02
Payer: COMMERCIAL

## 2025-04-02 DIAGNOSIS — M25.562 ACUTE PAIN OF LEFT KNEE: ICD-10-CM

## 2025-04-02 DIAGNOSIS — M23.92 INTERNAL DERANGEMENT OF LEFT KNEE: ICD-10-CM

## 2025-04-02 DIAGNOSIS — M25.362 PATELLAR INSTABILITY OF LEFT KNEE: ICD-10-CM

## 2025-04-02 DIAGNOSIS — S83.92XA SPRAIN OF LEFT KNEE, INITIAL ENCOUNTER: Primary | ICD-10-CM

## 2025-04-02 PROCEDURE — 73564 X-RAY EXAM KNEE 4 OR MORE: CPT | Mod: LT

## 2025-04-02 PROCEDURE — 99213 OFFICE O/P EST LOW 20 MIN: CPT | Performed by: INTERNAL MEDICINE

## 2025-04-02 PROCEDURE — 99204 OFFICE O/P NEW MOD 45 MIN: CPT | Performed by: INTERNAL MEDICINE

## 2025-04-02 PROCEDURE — L1812 KO ELASTIC W/JOINTS PRE OTS: HCPCS | Performed by: INTERNAL MEDICINE

## 2025-04-02 PROCEDURE — 73564 X-RAY EXAM KNEE 4 OR MORE: CPT | Mod: LEFT SIDE | Performed by: INTERNAL MEDICINE

## 2025-04-02 RX ORDER — METHYLPREDNISOLONE 4 MG/1
TABLET ORAL
Qty: 21 TABLET | Refills: 0 | Status: SHIPPED | OUTPATIENT
Start: 2025-04-02

## 2025-04-02 NOTE — PROGRESS NOTES
Acute Injury New Patient Visit    CC:   Chief Complaint   Patient presents with    Left Knee - Pain       HPI: Mallika is a 35 y.o. female presents today for evaluation for subacute left knee injury sustained three days ago while she was doing physical therapy. She states that her left knee popped as she was squatting. She notes past injuries to the left knee. She notes worsening left knee pain. She is here for initial evaluation and x-rays.         Review of Systems   GENERAL: Negative for malaise, significant weight loss, fever  MUSCULOSKELETAL: See HPI  NEURO:  Negative for numbness / tingling     Past Medical History  Past Medical History:   Diagnosis Date    Fatty liver     about 3 or 4 years ago.       Medication review  Medication Documentation Review Audit       Reviewed by MARKEL Quesada (Nurse Practitioner) on 02/25/25 at 1331      Medication Order Taking? Sig Documenting Provider Last Dose Status   escitalopram (Lexapro) 10 mg tablet [id number] Yes Take 1 tablet (10 mg) by mouth once daily. Historical Provider, MD Taking Active   hydrOXYzine HCL (Atarax) 10 mg tablet [id number] Yes Take 1 tablet (10 mg) by mouth once daily as needed for itching. Historical Provider, MD  Active   lamoTRIgine (LaMICtal) 150 mg tablet [id number] Yes Take 1 tablet (150 mg) by mouth early in the morning.. Historical Provider, MD  Active   meloxicam (Mobic) 15 mg tablet [id number] Yes Take 1 tablet (15 mg) by mouth once daily. Luis Armando Alves, DO  Active   metFORMIN  mg 24 hr tablet [id number] Yes Take 1 tablet (500 mg) by mouth 2 times daily (morning and late afternoon). Do not crush, chew, or split. Luis Armando Alves, DO  Active   rosuvastatin (Crestor) 20 mg tablet [id number] Yes Take 1 tablet (20 mg) by mouth once daily. Luis Armando Alves, DO  Active   tirzepatide, weight loss, (Zepbound) 7.5 mg/0.5 mL injection [id number] Yes Inject 7.5 mg under the skin every 7 days. Luis Armando Alves, DO  Active   traZODone  (Desyrel) 100 mg tablet [id number] Yes Take 1 tablet (100 mg) by mouth as needed at bedtime for sleep. Historical Provider, MD Taking Active                    Allergies  Allergies   Allergen Reactions    Cefaclor Anaphylaxis, Angioedema and Swelling    Gabapentin Swelling     Caused arm and hand swelling    Phentermine Constipation     Caused constipation       Social History  Social History     Socioeconomic History    Marital status: Single     Spouse name: Not on file    Number of children: Not on file    Years of education: Not on file    Highest education level: Not on file   Occupational History    Not on file   Tobacco Use    Smoking status: Never     Passive exposure: Never    Smokeless tobacco: Never   Vaping Use    Vaping status: Never Used   Substance and Sexual Activity    Alcohol use: Not Currently    Drug use: Never    Sexual activity: Not on file   Other Topics Concern    Not on file   Social History Narrative    Not on file     Social Drivers of Health     Financial Resource Strain: Not on file   Food Insecurity: Not on file   Transportation Needs: Not on file   Physical Activity: Not on file   Stress: Not on file   Social Connections: Not on file   Intimate Partner Violence: Not on file   Housing Stability: Not on file       Surgical History  No past surgical history on file.    Physical Exam:  GENERAL:  Patient is awake, alert, and oriented to person place and time.  Patient appears well nourished and well kept.  Affect Calm, Not Acutely Distressed.  HEENT:  Normocephalic, Atraumatic, EOMI  CARDIOVASCULAR:  Hemodynamically stable.  RESPIRATORY:  Normal respirations with unlabored breathing.  Extremity: Left knee examination shows skin is intact.  There is no erythema or warmth.  Trace amount of effusion.  Can flex the right knee to 130 degrees with pain.  Full extension at 0 degrees.  Pain over the medial joint line.  Pain over the lateral joint line.  Pain over the medial and lateral patellar  facets.  There is no pain over the patellar or quadricep tendon.  No pain over the proximal tibia.  No pain over the popliteal fossa.  Negative valgus stress test.  Negative varus stress test.  Positive Akash's test medially with instability.  Positive Akash's test laterally with no instability.  Negative Lachman's test.  Patellar and quadricep mechanism intact.  Negative anterior and posterior drawer test.  Positive patellar apprehension test with instability.  Distal pulses are palpable, neurovascularly intact.  Walking with no significant antalgic gait.      Diagnostics: X-rays reviewed      Procedure: None    Assessment:   Left knee sprain  Left knee internal derangement  Left knee patellar instability    Plan: Mallika presents today for evaluation for acute left knee injury sustained three days ago while she was doing physical therapy, and felt a sharp pain and pop, since then is having persistent pain with instability. X-rays were reviewed we recommended MRI of the left knee for preoperative planning, a knee J brace for support and stability, Medrol dose Artemio for inflammation, and physical therapy. She will follow-up after the MRI of the left knee.     Orders Placed This Encounter    XR knee left 4+ views      At the conclusion of the visit there were no further questions by the patient/family regarding their plan of care.  Patient was instructed to call or return with any issues, questions, or concerns regarding their injury and/or treatment plan described above.     04/02/25 at 8:11 AM - Elia Espinal MD  Scribe Attestation  By signing my name below, I Augustine Donovanmo, Scribbrandie   attest that this documentation has been prepared under the direction and in the presence of Elia Espinal MD.    Office: [phone]    This note was prepared using voice recognition software.  The details of this note are correct and have been reviewed, and corrected to the best of my ability.  Some grammatical errors  may persist related to the Dragon software.

## 2025-04-12 ENCOUNTER — HOSPITAL ENCOUNTER (OUTPATIENT)
Dept: RADIOLOGY | Facility: HOSPITAL | Age: 35
Discharge: HOME | End: 2025-04-12
Payer: COMMERCIAL

## 2025-04-12 DIAGNOSIS — M25.362 PATELLAR INSTABILITY OF LEFT KNEE: ICD-10-CM

## 2025-04-12 DIAGNOSIS — S83.92XA SPRAIN OF LEFT KNEE, INITIAL ENCOUNTER: ICD-10-CM

## 2025-04-12 DIAGNOSIS — M23.92 INTERNAL DERANGEMENT OF LEFT KNEE: ICD-10-CM

## 2025-04-12 PROCEDURE — 73721 MRI JNT OF LWR EXTRE W/O DYE: CPT | Mod: LEFT SIDE | Performed by: RADIOLOGY

## 2025-04-12 PROCEDURE — 73721 MRI JNT OF LWR EXTRE W/O DYE: CPT | Mod: LT

## 2025-04-14 NOTE — PROGRESS NOTES
Physical Therapy  Physical Therapy Evaluation    Patient Name: Mallika Gutiérrez  MRN: [id number]  Today's Date: 4/16/2025  Time Calculation  Start Time: 1035  Stop Time: 1110  Time Calculation (min): 35 min  PT Evaluation Time Entry  PT Evaluation (Low) Time Entry: 27  PT Therapeutic Procedures Time Entry  Therapeutic Exercise Time Entry: 8                   Insurance:  COPAY 35 DED 0  Visit number: 1 of 9  Authorization info: NO AUTH REQ  Insurance Type: ANTHEM BMN PT OT      General:  Reason for visit: 1 of 9  Referred by: Elia Espinal MD     Current Problem:  M25.362ICD-10-CMPatellar instability of left knee  M23.92ICD-10-CMInternal derangement of left knee  S83.92XDICD-10-CMSprain of left knee, subsequent encounter    Precautions: Per MR   Wearing neoprene sleeve w/ patellar lateral pad for support      Medical History Form: Reviewed (scanned into chart)    Subjective:   Mallika is a 35 y.o. female presents today for evaluation for subacute left knee injury sustained three days ago while she was doing physical therapy. She states that her left knee popped as she was squatting. She notes past injuries to the left knee. She notes worsening left knee pain. She is here for initial evaluation and x-rays. -Encounter note 4/2/25    Chief Complaint: Patient presents to clinic L knee pain    AGUILAR: Pt was doing a squat.    Current Condition:   Same    Pain:  Pain Assessment: 0-10  0-10 (Numeric) Pain Score: 6  Pain Type: Acute pain  Pain Location: Knee  Pain Orientation: Left  Pain Descriptors: Sore  Pain Frequency: Constant/continuous  Pain Onset: Ongoing  Clinical Progression: Not changed  Aggravating Factors:  Bending/Straightening Knee  Relieving Factors:  Rest    Relevant Information (PMH & Previous Tests/Imaging):  4/12/25  IMPRESSION:  Mild sprain of the origin of the medial collateral ligament.      Mild sprain at the insertion of the lateral collateral ligament.    Previous Interventions/Treatments:  None    Prior Level of Function (PLOF)  Patient previously independent with all ADLs  Exercise/Physical Activity: na  Work/School: FT -on feet -will be off until mid May    Patients Living Environment: Reviewed and no concern    Primary Language: English     Patient's Goal(s) for Therapy: strengthen knee    Red Flags: Do you have any of the following? No  Fever/chills, unexplained weight changes, dizziness/fainting, unexplained change in bowel or bladder functions, unexplained malaise or muscle weakness, night pain/sweats, numbness or tingling    Objective:    Knee ROM (degrees) -  L knee Extension: -5 AROM   L knee Flexion: 125 AROM       Knee MMT (/5) -   L knee Extension: 4   L knee Flexion: 4-       Integumentary -   Redness from brace noted, otherwise no skin issues    Palpation -  TTP Both Collateral Ligaments of L knee tender  Tightness Noted of Mild Quad/Ham     Special Tests -  Lachman negative left  Anterior Drawer negative left  Posterior Drawer negative left  Valgus/Varus positive left      Gait -  Pt is ambulating without an assistive device (na). Pattern: Slight Antalgic Pattern noted    Posture -   Lower Extremity Functional Movements  Transfers: Ind  Gait: Ind      Outcome Measures:  Other Measures  Lower Extremity Funtional Score (LEFS): 38       EDUCATION: Pt educated in HEP, PT POC, anatomy, activity avoidance, proper positioning/posture, use of cold , pt demonstrates understanding of PT info, all questions answered.  Instructed pt to loosen brace when not on feet to avoid skin irritation.    Goals: Set and discussed today  Increase ROM to WFL of L knee  Increase Strength where deficits noted by 1/3 to 1 mm grade of L knee  Ind w/ HEP to expedite progress and promote goal achievement of L knee  Improve Outcome score to  evidence improved function of L knee  Return to PLOF of L knee  Decrease pain to 2/10 or less of L knee      Plan of care was developed with input and agreement by the  "patient.    Treatment Performed:    Therapeutic Exercise:    8 min  QS 10x10\"  SLR 3x10  Bridges 3x10    Assessment: 36 y/o F presents with c/o L knee pain. Upon examination patient demonstrates moderate pain limiting overall functional mobility including amb. Activity limitations and participations restrictions include work duties. Pt to benefit from outpatient PT to address deficits, maximize functional mobility and improve QOL.  The clinical presentation of this patient is stable and their history and examination findings are consistent with a low complexity evaluation with good rehab potential.            Plan:     Planned Interventions include: therapeutic exercise, self-care home management, manual therapy, therapeutic activities, gait training, neuromuscular coordination, vasopneumatic, dry needling, aquatic therapy and modalities PRN.  Frequency: 2x/wk  Duration: 4weeks      Cal Blackman PT    "

## 2025-04-15 ENCOUNTER — OFFICE VISIT (OUTPATIENT)
Dept: ORTHOPEDIC SURGERY | Facility: CLINIC | Age: 35
End: 2025-04-15
Payer: COMMERCIAL

## 2025-04-15 DIAGNOSIS — S83.419A SPRAIN OF MEDIAL COLLATERAL LIGAMENT OF KNEE, INITIAL ENCOUNTER: Primary | ICD-10-CM

## 2025-04-15 DIAGNOSIS — S53.432A SPRAIN OF LATERAL COLLATERAL LIGAMENT OF ELBOW, LEFT, INITIAL ENCOUNTER: ICD-10-CM

## 2025-04-15 PROCEDURE — 1036F TOBACCO NON-USER: CPT | Performed by: INTERNAL MEDICINE

## 2025-04-15 PROCEDURE — 99213 OFFICE O/P EST LOW 20 MIN: CPT | Performed by: INTERNAL MEDICINE

## 2025-04-15 NOTE — PROGRESS NOTES
CC:   Chief Complaint   Patient presents with    Left Knee - Follow-up     MRI results review  Sprain/internal derangement        HPI: Mallika is a 35 y.o. female presents today for follow-up for MRI review for the left knee.  Still being some pain, however she does feel better in the knee brace.        Review of Systems   GENERAL: Negative for malaise, significant weight loss, fever  MUSCULOSKELETAL: See HPI  NEURO:  Negative for numbness / tingling     Past Medical History  Past Medical History:   Diagnosis Date    Fatty liver     about 3 or 4 years ago.       Medication review  Medication Documentation Review Audit       Reviewed by Brooke Qiu MA (Medical Assistant) on 04/15/25 at 1443      Medication Order Taking? Sig Documenting Provider Last Dose Status   cholecalciferol (Vitamin D-3) 125 mcg (5000 UT) capsule [id number]  Take 1 capsule (125 mcg) by mouth once daily. Luis Armando Alves, DO  Active   escitalopram (Lexapro) 10 mg tablet [id number] No Take 1 tablet (10 mg) by mouth once daily. Historical Provider, MD Taking Active   hydrOXYzine HCL (Atarax) 10 mg tablet [id number]  Take 1 tablet (10 mg) by mouth once daily as needed for itching. Historical Provider, MD  Active   lamoTRIgine (LaMICtal) 150 mg tablet [id number]  Take 1 tablet (150 mg) by mouth early in the morning.. Historical Provider, MD  Active   meloxicam (Mobic) 15 mg tablet [id number]  Take 1 tablet (15 mg) by mouth once daily. Luis Armando Alves DO  Active   metFORMIN  mg 24 hr tablet [id number]  Take 1 tablet (500 mg) by mouth 2 times daily (morning and late afternoon). Do not crush, chew, or split. Luis Armando Alves DO  Active   methylPREDNISolone (Medrol Dospak) 4 mg tablets [id number]  Use as directed by package instructions Elia Espinal MD  Active   rosuvastatin (Crestor) 20 mg tablet [id number]  Take 1 tablet (20 mg) by mouth once daily. Luis Armando Alves DO  Active   tirzepatide, weight loss, (Zepbound) 7.5 mg/0.5 mL  injection [id number]  Inject 7.5 mg under the skin every 7 days. Luis Armando Alves, DO  Active   traZODone (Desyrel) 100 mg tablet [id number] No Take 1 tablet (100 mg) by mouth as needed at bedtime for sleep. Historical Provider, MD Taking Active                    Allergies  Allergies   Allergen Reactions    Cefaclor Anaphylaxis, Angioedema and Swelling    Gabapentin Swelling     Caused arm and hand swelling    Phentermine Constipation     Caused constipation       Social History  Social History     Socioeconomic History    Marital status: Single     Spouse name: Not on file    Number of children: Not on file    Years of education: Not on file    Highest education level: Not on file   Occupational History    Not on file   Tobacco Use    Smoking status: Never     Passive exposure: Never    Smokeless tobacco: Never   Vaping Use    Vaping status: Never Used   Substance and Sexual Activity    Alcohol use: Not Currently    Drug use: Never    Sexual activity: Not on file   Other Topics Concern    Not on file   Social History Narrative    Not on file     Social Drivers of Health     Financial Resource Strain: Not on file   Food Insecurity: Not on file   Transportation Needs: Not on file   Physical Activity: Not on file   Stress: Not on file   Social Connections: Not on file   Intimate Partner Violence: Not on file   Housing Stability: Not on file       Surgical History  History reviewed. No pertinent surgical history.    Physical Exam:  GENERAL:  Patient is awake, alert, and oriented to person place and time.  Patient appears well nourished and well kept.  Affect Calm, Not Acutely Distressed.  HEENT:  Normocephalic, Atraumatic, EOMI  CARDIOVASCULAR:  Hemodynamically stable.  RESPIRATORY:  Normal respirations with unlabored breathing.  Extremity: Left knee examination shows skin is intact.  There is no erythema or warmth.  No effusion.  Can flex the left knee to 130 degrees.  Full extension at 0 degrees.  No pain over the  medial joint line.  No pain over the lateral joint line.  There is no pain over the patellar or quadricep tendon.  No pain over the proximal tibia.  No pain over the popliteal fossa.  Positive valgus stress test with slight instability.  Positive varus stress test with no instability.  Negative Akash's test medially with no instability.  Negative Akash's test laterally with no instability.  Negative Lachman's test.  Patellar and quadricep mechanism intact.  Negative anterior and posterior drawer test.  Negative patellar apprehension test.  Distal pulses are palpable, neurovascularly intact.  Walking with no significant antalgic gait.      Diagnostics: MRI reviewed  MR knee left wo IV contrast  Narrative: Interpreted By:  Inderjit Doherty,   STUDY:  MR KNEE LEFT WO IV CONTRAST;      INDICATION:  Signs/Symptoms:pain.      COMPARISON:  Plain film radiographs performed on April 2, 2025      ACCESSION NUMBER(S):  [id number]      ORDERING CLINICIAN:  CIPRIANO ASTUDILLO      TECHNIQUE:  Multiplanar multisequential MRI left knee without contrast.      FINDINGS:  Lateral meniscus intact.      Medial meniscus intact.      Anterior cruciate ligament normal.      Posterior cruciate ligament normal.      Extensor tendons are normal.      There is a mild sprain of the medial collateral ligament with some  edema along the superficial fibers. No gross fiber disruption seen.      There is also a mild sprain of the lateral collateral ligament with  some increased signal at the fibular insertion with surrounding soft  tissue edema. No widening or major structure disruption.      No evidence of fracture.      No marrow replacement lesion.      No other muscular signal abnormality.      No evidence of soft tissue mass.      Impression: Mild sprain of the origin of the medial collateral ligament.      Mild sprain at the insertion of the lateral collateral ligament.      Signed by: Inderjit Doherty 4/13/2025 7:15 AM  Dictation workstation:    [id number]        Procedure: None    Assessment:   Left knee mild MCL sprain  Left knee mild LCL sprain    Plan: Mallika presents today for follow-up for MRI review of the left knee. We discussed the MRI review in detail today.  We recommended nonsurgical treatment to continue with her hinged knee brace for support and stability.  Will also get involved with formal physical therapy.  She is unable to perform her duties at work, should be off work until May 17, 2025.  She will call for any work modifications.  She will follow-up in 4 weeks and reevaluate her knee.    No orders of the defined types were placed in this encounter.     At the conclusion of the visit there were no further questions by the patient/family regarding their plan of care.  Patient was instructed to call or return with any issues, questions, or concerns regarding their injury and/or treatment plan described above.     04/15/25 at 2:46 PM - Elia Espinal MD  Scribe Attestation  By signing my name below, I Augustine José Miguel, Scribbrandie   attest that this documentation has been prepared under the direction and in the presence of Elia Espinal MD.    Office: [phone]    This note was prepared using voice recognition software.  The details of this note are correct and have been reviewed, and corrected to the best of my ability.  Some grammatical errors may persist related to the Dragon software.

## 2025-04-15 NOTE — LETTER
April 15, 2025     Patient: Mallika Gutiérrez   YOB: 1990   Date of Visit: 4/15/2025       To Whom It May Concern:    It is my medical opinion that Mallika Gutiérrez  should remain off of work until 05/17/2025 .    If you have any questions or concerns, please don't hesitate to call.         Sincerely,          Elia Espinal MD    CC: Brooke Qiu MA

## 2025-04-16 ENCOUNTER — APPOINTMENT (OUTPATIENT)
Dept: PRIMARY CARE | Facility: CLINIC | Age: 35
End: 2025-04-16
Payer: COMMERCIAL

## 2025-04-16 ENCOUNTER — EVALUATION (OUTPATIENT)
Dept: PHYSICAL THERAPY | Facility: CLINIC | Age: 35
End: 2025-04-16
Payer: COMMERCIAL

## 2025-04-16 VITALS
HEIGHT: 62 IN | TEMPERATURE: 43.9 F | HEART RATE: 82 BPM | SYSTOLIC BLOOD PRESSURE: 100 MMHG | DIASTOLIC BLOOD PRESSURE: 66 MMHG | BODY MASS INDEX: 36.55 KG/M2 | WEIGHT: 198.6 LBS

## 2025-04-16 DIAGNOSIS — E66.812 CLASS 2 SEVERE OBESITY DUE TO EXCESS CALORIES WITH SERIOUS COMORBIDITY AND BODY MASS INDEX (BMI) OF 36.0 TO 36.9 IN ADULT: ICD-10-CM

## 2025-04-16 DIAGNOSIS — E55.9 VITAMIN D DEFICIENCY: ICD-10-CM

## 2025-04-16 DIAGNOSIS — M25.362 PATELLAR INSTABILITY OF LEFT KNEE: ICD-10-CM

## 2025-04-16 DIAGNOSIS — E66.01 CLASS 2 SEVERE OBESITY DUE TO EXCESS CALORIES WITH SERIOUS COMORBIDITY AND BODY MASS INDEX (BMI) OF 36.0 TO 36.9 IN ADULT: ICD-10-CM

## 2025-04-16 DIAGNOSIS — S83.92XA SPRAIN OF LEFT KNEE, INITIAL ENCOUNTER: ICD-10-CM

## 2025-04-16 DIAGNOSIS — S83.412A SPRAIN OF MEDIAL COLLATERAL LIGAMENT OF LEFT KNEE, INITIAL ENCOUNTER: ICD-10-CM

## 2025-04-16 DIAGNOSIS — Z71.3 ENCOUNTER FOR WEIGHT LOSS COUNSELING: ICD-10-CM

## 2025-04-16 DIAGNOSIS — S83.422A SPRAIN OF LATERAL COLLATERAL LIGAMENT OF LEFT KNEE, INITIAL ENCOUNTER: ICD-10-CM

## 2025-04-16 DIAGNOSIS — S83.412D SPRAIN OF MEDIAL COLLATERAL LIGAMENT OF LEFT KNEE, SUBSEQUENT ENCOUNTER: Primary | ICD-10-CM

## 2025-04-16 DIAGNOSIS — Z00.00 HEALTH MAINTENANCE EXAMINATION: Primary | ICD-10-CM

## 2025-04-16 DIAGNOSIS — K76.0 FATTY LIVER DISEASE, NONALCOHOLIC: ICD-10-CM

## 2025-04-16 DIAGNOSIS — M23.92 INTERNAL DERANGEMENT OF LEFT KNEE: ICD-10-CM

## 2025-04-16 DIAGNOSIS — E78.00 PURE HYPERCHOLESTEROLEMIA: ICD-10-CM

## 2025-04-16 PROCEDURE — 99395 PREV VISIT EST AGE 18-39: CPT | Performed by: INTERNAL MEDICINE

## 2025-04-16 PROCEDURE — 1036F TOBACCO NON-USER: CPT | Performed by: INTERNAL MEDICINE

## 2025-04-16 PROCEDURE — 99213 OFFICE O/P EST LOW 20 MIN: CPT | Performed by: INTERNAL MEDICINE

## 2025-04-16 PROCEDURE — G8433 SCR FOR DEP NOT CPT DOC RSN: HCPCS | Performed by: INTERNAL MEDICINE

## 2025-04-16 PROCEDURE — 97110 THERAPEUTIC EXERCISES: CPT | Mod: GP | Performed by: PHYSICAL THERAPIST

## 2025-04-16 PROCEDURE — 3008F BODY MASS INDEX DOCD: CPT | Performed by: INTERNAL MEDICINE

## 2025-04-16 PROCEDURE — 97161 PT EVAL LOW COMPLEX 20 MIN: CPT | Mod: GP | Performed by: PHYSICAL THERAPIST

## 2025-04-16 ASSESSMENT — ENCOUNTER SYMPTOMS
VOMITING: 0
FEVER: 0
ABDOMINAL PAIN: 0
SORE THROAT: 0
BLOOD IN STOOL: 0
DIARRHEA: 0
SHORTNESS OF BREATH: 0
LIGHT-HEADEDNESS: 0
CHILLS: 0
AGITATION: 0
COUGH: 0
DYSURIA: 0
DIZZINESS: 0
NAUSEA: 0
CONFUSION: 0

## 2025-04-16 ASSESSMENT — PATIENT HEALTH QUESTIONNAIRE - PHQ9
1. LITTLE INTEREST OR PLEASURE IN DOING THINGS: NOT AT ALL
2. FEELING DOWN, DEPRESSED OR HOPELESS: NOT AT ALL
SUM OF ALL RESPONSES TO PHQ9 QUESTIONS 1 AND 2: 0

## 2025-04-16 ASSESSMENT — PAIN - FUNCTIONAL ASSESSMENT: PAIN_FUNCTIONAL_ASSESSMENT: 0-10

## 2025-04-16 ASSESSMENT — PAIN SCALES - GENERAL: PAINLEVEL_OUTOF10: 6

## 2025-04-16 ASSESSMENT — PAIN DESCRIPTION - DESCRIPTORS: DESCRIPTORS: SORE

## 2025-04-16 NOTE — PROGRESS NOTES
Subjective   Mallika Gutiérrez is a 35 y.o. female and is here for a comprehensive physical exam. The patient reports problems - left knee injury FMLA .    Do you take any herbs or supplements that were not prescribed by a doctor? no  Are you taking calcium supplements? no  Are you taking aspirin daily? no    Do you have pain that bothers you in your daily life? yes    Patient presents to the office today for annual visit.  She is up-to-date on age and gender recommended screening exception of cervical cancer screening for which I recommend she makes an appointment with her OB/GYN.  Denies any family history of breast cancer or colon cancer.  She is up-to-date on age and gender recommended vaccines. Patient states she was doing squats and her knee gave out. She has a partial tear of the MCL and LCL. She needs FMLA paperwork filled out today for this. She states she asked her Orthopedic specialist to fill out the paperwork and was told their department for that would take 5-10 days.  Patient would like to go up on the dose of Zepbound.  She has been on the 7.5 mg once weekly dose for 6 months.           Review of Systems   Constitutional:  Negative for chills and fever.   HENT:  Negative for sore throat.    Eyes:  Negative for visual disturbance.   Respiratory:  Negative for cough and shortness of breath.    Cardiovascular:  Negative for chest pain and leg swelling.   Gastrointestinal:  Negative for abdominal pain, blood in stool, diarrhea, nausea and vomiting.   Genitourinary:  Negative for dysuria.   Skin:  Negative for rash.   Neurological:  Negative for dizziness, syncope and light-headedness.   Psychiatric/Behavioral:  Negative for agitation and confusion.         Objective   Physical Exam  Vitals and nursing note reviewed.   Constitutional:       General: She is not in acute distress.     Appearance: Normal appearance. She is obese. She is not ill-appearing, toxic-appearing or diaphoretic.   HENT:      Head:  Normocephalic and atraumatic.      Mouth/Throat:      Mouth: Mucous membranes are moist.      Pharynx: Oropharynx is clear. No oropharyngeal exudate.   Eyes:      Pupils: Pupils are equal, round, and reactive to light.   Cardiovascular:      Rate and Rhythm: Normal rate and regular rhythm.      Heart sounds: Normal heart sounds.   Pulmonary:      Effort: Pulmonary effort is normal. No respiratory distress.      Breath sounds: Normal breath sounds. No wheezing, rhonchi or rales.   Abdominal:      General: Bowel sounds are normal. There is no distension.      Palpations: Abdomen is soft.      Tenderness: There is no abdominal tenderness.   Musculoskeletal:      Cervical back: Neck supple.      Right lower leg: No edema.      Left lower leg: No edema.   Lymphadenopathy:      Cervical: No cervical adenopathy.   Skin:     General: Skin is warm and dry.      Coloration: Skin is not jaundiced or pale.      Findings: No rash.   Neurological:      General: No focal deficit present.      Mental Status: She is alert and oriented to person, place, and time.      Cranial Nerves: No cranial nerve deficit.   Psychiatric:         Mood and Affect: Mood normal.         Behavior: Behavior normal.         Thought Content: Thought content normal.         Judgment: Judgment normal.         Assessment/Plan   Healthy female exam.      1.  Patient up-to-date on age and gender recommend screening exception of cervical cancer screening for which she is recommended follow-up with her OB/GYN.  She is up-to-date on vaccinations.  2. Patient Counseling:  --Nutrition: Stressed importance of moderation in sodium/caffeine intake, saturated fat and cholesterol, caloric balance, sufficient intake of fresh fruits, vegetables, fiber, calcium, iron, and 1 mg of folate supplement per day (for females capable of pregnancy).  --Discussed the issue of estrogen replacement, calcium supplement, and the daily use of baby aspirin.  --Exercise: Stressed the  importance of regular exercise.   --Substance Abuse: Discussed cessation/primary prevention of tobacco, alcohol, or other drug use; driving or other dangerous activities under the influence; availability of treatment for abuse.    --Sexuality: Discussed sexually transmitted diseases, partner selection, use of condoms, avoidance of unintended pregnancy  and contraceptive alternatives.   --Injury prevention: Discussed safety belts, safety helmets, smoke detector, smoking near bedding or upholstery.   --Dental health: Discussed importance of regular tooth brushing, flossing, and dental visits.  --Immunizations reviewed.  --Discussed benefits of screening colonoscopy.  --After hours service discussed with patient  3. Discussed the patient's BMI with her.  The BMI is above average. The patient received Provided instructions on dietary changes  Provided instructions on exercise because they have an above normal BMI.  4. Follow up in 3 months    Hypercholesterolemia: Chronic, stable continue rosuvastatin    Fatty liver disease nonalcoholic: She is working on weight loss dietary changes low-fat diet improving her cholesterol is on a GLP-1 medication.    Vitamin D deficiency: She is now taking vitamin D supplement consistently    Sprain of medial collateral ligament of left knee/sprain of lateral collateral ligament of left knee: She injured her knee when doing squats.  She has seen orthopedic surgery who advised conservative treatment but recommend her to be off work for the next month.  She was provided with [place] paperwork today.  Orthopedic office note was reviewed.

## 2025-04-23 ENCOUNTER — APPOINTMENT (OUTPATIENT)
Dept: PHYSICAL THERAPY | Facility: CLINIC | Age: 35
End: 2025-04-23
Payer: COMMERCIAL

## 2025-04-23 DIAGNOSIS — M25.362 PATELLAR INSTABILITY OF LEFT KNEE: ICD-10-CM

## 2025-04-23 DIAGNOSIS — M23.92 INTERNAL DERANGEMENT OF LEFT KNEE: ICD-10-CM

## 2025-04-23 DIAGNOSIS — S83.412D SPRAIN OF MEDIAL COLLATERAL LIGAMENT OF LEFT KNEE, SUBSEQUENT ENCOUNTER: Primary | ICD-10-CM

## 2025-04-23 PROCEDURE — 97110 THERAPEUTIC EXERCISES: CPT | Mod: GP,CQ

## 2025-04-23 ASSESSMENT — PAIN DESCRIPTION - DESCRIPTORS: DESCRIPTORS: ACHING

## 2025-04-23 ASSESSMENT — PAIN SCALES - GENERAL: PAINLEVEL_OUTOF10: 3

## 2025-04-23 ASSESSMENT — PAIN - FUNCTIONAL ASSESSMENT: PAIN_FUNCTIONAL_ASSESSMENT: 0-10

## 2025-04-23 NOTE — PROGRESS NOTES
"Physical Therapy Treatment    Patient Name: Mallika Gutiérrez  MRN: [id number]  Today's Date: 4/23/2025  Time Calculation  Start Time: 0835  Stop Time: 0916  Time Calculation (min): 41 min    Insurance  COPAY 35 DED 0  Visit number: 2 of 9  Authorization info: NO AUTH REQ  Insurance Type: ANTHEM BMN PT OT       Current Problem  1. Sprain of medial collateral ligament of left knee, subsequent encounter        2. Patellar instability of left knee        3. Internal derangement of left knee            Subjective    General   Pt reports she was a little sore following previous visit, \"but not too bad\".  States increase in sx's continued for \"about an hour\".  She has been working on the HEP given first visit & states they are going well.  Reports she has been able to do more, for longer before sx's begin to increase.  No new complaints.      Precautions  Precautions  Precautions Comment: No recent falls reported    Pain  Pain Assessment  Pain Assessment: 0-10  0-10 (Numeric) Pain Score: 3  Pain Location: Knee  Pain Orientation: Left, Outer  Pain Radiating Towards: none reported  Pain Descriptors: Aching  Pain Frequency: Intermittent  Clinical Progression: Gradually improving  Effect of Pain on Daily Activities: Walking>10-15 min, Negotiating Stairs, Household activities. Work actvities    Objective    Wearing neoprene sleeve w/ patellar lateral pad for support.  No assistive device for ambulation.    Treatments:  Therapeutic Exercises (41590): 39 Minutes, 3 Units    Activities:  REY: --> Add NV  QS 10\" 2x10  HS Stretch 30\" x3  Calf Stretch 30\" x3  SLR 3x10  Bridges: 3x10  SL SLR: 3x10  Clamshell: 3x10  --> Add TB NV  LAQ: 2x10  HR/TR: x20  Stdg Hip PRE's: 3 way, x10 each DAVID  --> Add TB NV  TB March --> Add NV  Squats --> Add NV       Assessment:   Reviewed activities initiated first visit & continued to progress with rehab activities, as tolerated, to increase ROM, strength & stability of L knee.  She exhibits good recall of " the current activities & had good follow through to vc's for general technique and new activities given.  Most challenged with clamshells, SL SLR, LAQ & stabilizing on L LE with stdg hip PRE's secondary to general weakness.  Exhibits more muscle fatigue as session progresses & less reps completed.  Reports increase in discomfort (to 6/10) at end of session.  However, overall, did well with session.  Anticipate she will be able to continue with POC & progressions as tolerated.    Plan:   Assess response to progression of activities this visit.  Continue with POC & progress with activities as tolerated to increase ROM, strength, stability of L knee to improve functional mobility & increase capacity to return to PLOF with daily, work activities.  Plan to update HEP at next visit.

## 2025-04-25 ENCOUNTER — APPOINTMENT (OUTPATIENT)
Dept: PHYSICAL THERAPY | Facility: CLINIC | Age: 35
End: 2025-04-25
Payer: COMMERCIAL

## 2025-04-28 ENCOUNTER — APPOINTMENT (OUTPATIENT)
Dept: PHYSICAL THERAPY | Facility: CLINIC | Age: 35
End: 2025-04-28
Payer: COMMERCIAL

## 2025-04-29 NOTE — PROGRESS NOTES
"  Physical Therapy Treatment    Patient Name: Mallika Gutiérrez  MRN: [id number]  Today's Date: 4/30/2025  Time Calculation  Start Time: 0747  Stop Time: 0827  Time Calculation (min): 40 min     PT Therapeutic Procedures Time Entry  Therapeutic Exercise Time Entry: 40                   Current Problem  1. Patellar instability of left knee        2. Internal derangement of left knee            Insurance  COPAY 35 DED 0  Visit number: 3 of 9  Authorization info: NO AUTH REQ  Insurance Type: ANTHEM BMN PT OT      Precautions       Subjective   Pt reports no pain this date and feels it is getting better.    Pain  Pain Assessment: 0-10  0-10 (Numeric) Pain Score: 0 - No pain  Pain Location: Knee  Pain Orientation: Left  Pain Descriptors: Aching  Pain Frequency: Intermittent  Clinical Progression: Gradually improving      Objective     Treatments:  Ther Ex: L knee    REY: 5'  QS 10\" 2x10---  HS Stretch 30\" x3 (seated)  Calf Stretch 30\" x3 (seated)  SLR 3x10  SLR VMO 3x10  Bridges: 3x10  SL SLR: 3x10  Clamshell: RTB 3x10    LAQ: 10x10\"  HR/TR: 3x10---  Stdg Hip PRE's: 3 way, x10 each DAVID  --> Add TB NV  TB March --> Add NV  Squats --> Add NV      Access Code: [email or web address]  URL: [email or web address]  Date: 04/30/2025  Prepared by: Cal Blackman PT    Exercises  - Supine Active Straight Leg Raise  - 1 x daily - 5-6 x weekly - 3 sets - 10 reps  - Supine Bridge  - 1 x daily - 5-6 x weekly - 3 sets - 10 reps  - Clamshell  - 1 x daily - 5-6 x weekly - 3 sets - 10 reps  - Sidelying Hip Abduction  - 1 x daily - 5-6 x weekly - 3 sets - 10 reps  - Seated Long Arc Quad  - 1 x daily - 5-6 x weekly - 1-2 sets - 10 reps - 10s hold  - Standing Heel Raise with Support  - 1 x daily - 5-6 x weekly - 3 sets - 10 reps  - Heel Toe Raises with Counter Support  - 1 x daily - 5-6 x weekly - 3 sets - 10 reps  - Seated Hamstring Stretch  - 1 x daily - 5-6 x weekly - 1 sets - 5 reps - 30s hold  - Seated Calf Stretch with Strap  - " 1 x daily - 5-6 x weekly - 1 sets - 5 reps - 30s hold    Manual: NT    Assessment:  Pt able to tolerate tx session well this date w/ no adverse effects noted from tx.  Pt compliant w/ therapy and appears to be making gains w/ program  Still requires skilled therapy  to address functional and measurable deficits that impair pt's QOL.     Plan:  D/t noted impairments and dysfunction of  L knee, PT will cont to address deficits of strength and ROM via therapeutic exs and modalities PRN and further assist w/ pain reduction.             Cal Blackman, PT

## 2025-04-30 ENCOUNTER — APPOINTMENT (OUTPATIENT)
Dept: PHYSICAL THERAPY | Facility: CLINIC | Age: 35
End: 2025-04-30
Payer: COMMERCIAL

## 2025-04-30 DIAGNOSIS — M23.92 INTERNAL DERANGEMENT OF LEFT KNEE: ICD-10-CM

## 2025-04-30 DIAGNOSIS — M25.362 PATELLAR INSTABILITY OF LEFT KNEE: Primary | ICD-10-CM

## 2025-04-30 PROCEDURE — 97110 THERAPEUTIC EXERCISES: CPT | Mod: GP | Performed by: PHYSICAL THERAPIST

## 2025-04-30 ASSESSMENT — PAIN - FUNCTIONAL ASSESSMENT: PAIN_FUNCTIONAL_ASSESSMENT: 0-10

## 2025-04-30 ASSESSMENT — PAIN DESCRIPTION - DESCRIPTORS: DESCRIPTORS: ACHING

## 2025-04-30 ASSESSMENT — PAIN SCALES - GENERAL: PAINLEVEL_OUTOF10: 0 - NO PAIN

## 2025-05-07 ENCOUNTER — APPOINTMENT (OUTPATIENT)
Dept: PHYSICAL THERAPY | Facility: CLINIC | Age: 35
End: 2025-05-07
Payer: COMMERCIAL

## 2025-05-07 DIAGNOSIS — M25.362 PATELLAR INSTABILITY OF LEFT KNEE: Primary | ICD-10-CM

## 2025-05-07 DIAGNOSIS — M23.92 INTERNAL DERANGEMENT OF LEFT KNEE: ICD-10-CM

## 2025-05-07 PROCEDURE — 97110 THERAPEUTIC EXERCISES: CPT | Mod: GP,CQ

## 2025-05-07 NOTE — PROGRESS NOTES
"Physical Therapy Treatment    Patient Name: Mallika Gutiérrez  MRN: [id number]  Today's Date: 5/7/2025  Time Calculation  Start Time: 0745  Stop Time: 0825  Time Calculation (min): 40 min  PT Therapeutic Procedures Time Entry  Therapeutic Exercise Time Entry: 38       Insurance  COPAY 35 DED 0  Visit number: 4 of 9  Authorization info: NO AUTH REQ  Insurance Type: ANTHEM BMN PT OT    Current Problem  1. Patellar instability of left knee        2. Internal derangement of left knee            Subjective   General   Pt. Reports she is doing well.   Precautions     Pain       Objective   Treatments:  REY: 6'  HS Stretch 30\" x3 (seated)  Calf Stretch 30\" x3 (standing on 1/2 roll)  SLR 3x10  SLR VMO 3x10  Bridges: 3x10  SL SLR: 3x10  LAQ: 20x5\"  HR/TR: 1/2 roll 2x10  Stdg Hip PRE's: 3 way DAVID RTB x20  STS 3x10  Step ups F/L 8\" x20  Step overs 6\" x20  Tapdowns 4\" 2x10    Assessment:   Pt. Progressing w/ all exercises well. Pt. Displayed moderate fatigue w/ her quad shaking while performing LAQs today. Added 1/2 roll HR/TR, STS, TB hip 3-way, and steps for increasing LE strength/endurance. Pt. Will continue w/ current HEP. Pt. Will continue to benefit from skilled PT for increasing quad strength/endurance for performing ADLs.     Plan:   Continue to increase strength.     OP EDUCATION:       Goals:     "

## 2025-05-09 ENCOUNTER — APPOINTMENT (OUTPATIENT)
Dept: PHYSICAL THERAPY | Facility: CLINIC | Age: 35
End: 2025-05-09
Payer: COMMERCIAL

## 2025-05-09 DIAGNOSIS — M25.362 PATELLAR INSTABILITY OF LEFT KNEE: Primary | ICD-10-CM

## 2025-05-09 DIAGNOSIS — M23.92 INTERNAL DERANGEMENT OF LEFT KNEE: ICD-10-CM

## 2025-05-09 PROCEDURE — 97110 THERAPEUTIC EXERCISES: CPT | Mod: GP,CQ

## 2025-05-09 ASSESSMENT — PAIN DESCRIPTION - DESCRIPTORS: DESCRIPTORS: ACHING

## 2025-05-09 ASSESSMENT — PAIN SCALES - GENERAL: PAINLEVEL_OUTOF10: 4

## 2025-05-09 ASSESSMENT — PAIN - FUNCTIONAL ASSESSMENT: PAIN_FUNCTIONAL_ASSESSMENT: 0-10

## 2025-05-09 NOTE — PROGRESS NOTES
"Physical Therapy Treatment    Patient Name: Mallika Gutiérrez  MRN: [id number]  Today's Date: 5/9/2025  Time Calculation  Start Time: 0747  Stop Time: 0835  Time Calculation (min): 48 min    Insurance  COPAY 35 DED 0  Visit number: 5 of 8  Authorization info: NO AUTH REQ  Insurance Type: ANTHEM BMN PT OT     Current Problem  1. Patellar instability of left knee        2. Internal derangement of left knee            Subjective    General   Pt arrives & states \"I am still sore from Wednesday.\"  Pt reports pain increased to 7/10 post session for about 4 hours.  States icing & using TENS unit did help to reduce sx's.  However, she has remained sore, around 3-4/10 ever since.  Walking aggravates sx's more.     Precautions  Precautions  Precautions Comment: No recent falls reported    Pain  Pain Assessment  Pain Assessment: 0-10  0-10 (Numeric) Pain Score: 4  Pain Location: Knee  Pain Orientation: Left, Posterior  Pain Radiating Towards: none reported  Pain Descriptors: Aching  Pain Frequency: Constant/continuous  Clinical Progression: Gradually improving  Effect of Pain on Daily Activities: Walking>10-15 min, Negotiating Stairs, Household activities. Work actvities    Objective   Mild antalgic gait observed.    Treatments:  Therapeutic Exercises (83941): 45 Minutes, 3 Units    Activities:  REY: 6'  HS Stretch 30\" x3 (seated)  Calf Stretch 30\" x3 (standing on 1/2 roll)  SLR 3x10  SLR VMO 3x10  Bridges: 3x10  SL SLR: 3x10  LAQ: 20x5\"  HR/TR: 1/2 roll 2x10  Stdg Hip PRE's: 3 way DAVID RTB x20  TB March: --> Add NV  STS 3x10  Step ups F/L 8\" x20  Step overs 6\" x20  Tapdowns 4\" 2x10     Assessment:   No changes made to current activities this visit & no new activities added secondary to increase in sx's following previous visit, which are still somewhat increased today.  She demonstrates good ability to complete all current activities & notes less difficulty/shaking, suggesting increase in overall strength.  However, she did report " mild increase in c/o pain overall with activities (to 6/10) today.  Discussed/encouraged continued use of ice/TENS at home to reduce sx's as needed.  Anticipate she will be able to continue with POC & progressions as tolerated next visit.    Plan:   Continue with POC & progress with activities as tolerated to increase ROM, strength, stability of L knee to improve functional mobility & increase capacity to return to PLOF with daily, work activities.

## 2025-05-12 NOTE — PROGRESS NOTES
"     Physical Therapy Treatment    Patient Name: Mallika Gutiérrez  MRN: [id number]  Today's Date: 5/14/2025  Time Calculation  Start Time: 0740  Stop Time: 0819  Time Calculation (min): 39 min     PT Therapeutic Procedures Time Entry  Therapeutic Exercise Time Entry: 39                   Current Problem  1. Internal derangement of left knee            Insurance  COPAY 35 DED 0  Visit number: 6 of 8  Authorization info: NO AUTH REQ  Insurance Type: ANTHEM BMN PT OT    Precautions       Subjective   Pt notes no problems w/ pain and feels she is doing better.  Pain  Pain Assessment: 0-10  0-10 (Numeric) Pain Score: 0 - No pain  Pain Location: Knee  Pain Orientation: Left  Clinical Progression: Gradually improving      Objective     Treatments:  Ther Ex: L Knee    REY: 6'  HS Stretch 30\" x3 (seated)---  Calf Stretch 30\" x3 (standing on 1/2 roll)---  SLR 3x10---  SLR VMO 3x10  Bridges: 3x10  SL SLR: 3x10  Prone SLR 3x10  LAQ: 20x5\"---  HR/TR: 1/2 roll 2x10---  Stdg Hip PRE's: 3 way BIL RTB x20---  Supine TB March RTB 20x  STS 20x  Step ups F/L 8\" x20  Step overs 6\" x20---  Tapdowns 4\" 2x10---  [employer] Walkouts L 4 F/B/S1/S2 10x ea      Assessment:  Pt able to tolerate tx session well this date w/ no adverse effects noted from tx.  Pt compliant w/ therapy and appears to be making gains w/ program  Still requires skilled therapy  to address functional and measurable deficits that impair pt's QOL.     Plan:  D/t noted impairments and dysfunction of  L knee, PT will cont to address deficits of strength and ROM via therapeutic exs and modalities PRN and further assist w/ pain reduction.  May resume all exs as appropriate.         Cal Blackman, PT      "

## 2025-05-14 ENCOUNTER — APPOINTMENT (OUTPATIENT)
Dept: PHYSICAL THERAPY | Facility: CLINIC | Age: 35
End: 2025-05-14
Payer: COMMERCIAL

## 2025-05-14 DIAGNOSIS — M23.92 INTERNAL DERANGEMENT OF LEFT KNEE: Primary | ICD-10-CM

## 2025-05-14 PROCEDURE — 97110 THERAPEUTIC EXERCISES: CPT | Mod: GP | Performed by: PHYSICAL THERAPIST

## 2025-05-14 ASSESSMENT — PAIN - FUNCTIONAL ASSESSMENT: PAIN_FUNCTIONAL_ASSESSMENT: 0-10

## 2025-05-14 ASSESSMENT — PAIN SCALES - GENERAL: PAINLEVEL_OUTOF10: 0 - NO PAIN

## 2025-05-15 ENCOUNTER — APPOINTMENT (OUTPATIENT)
Dept: ORTHOPEDIC SURGERY | Facility: CLINIC | Age: 35
End: 2025-05-15
Payer: COMMERCIAL

## 2025-05-21 ENCOUNTER — APPOINTMENT (OUTPATIENT)
Dept: PHYSICAL THERAPY | Facility: CLINIC | Age: 35
End: 2025-05-21
Payer: COMMERCIAL

## 2025-05-23 NOTE — PROGRESS NOTES
"     Physical Therapy Treatment    Patient Name: Mallika Gutiérrez  MRN: [id number]  Today's Date: 5/23/2025                            Current Problem  No diagnosis found.      Insurance  COPAY 35 DED 0  Visit number: 6 of 8  Authorization info: NO AUTH REQ  Insurance Type: KEVIN BMN PT OT    Precautions       Subjective   Pt notes no problems w/ pain and feels she is doing better.  Pain         Objective     Treatments:  Ther Ex: L Knee    REY: 6'  HS Stretch 30\" x3 (seated)---  Calf Stretch 30\" x3 (standing on 1/2 roll)---  SLR 3x10---  SLR VMO 3x10  Bridges: 3x10  SL SLR: 3x10  Prone SLR 3x10  LAQ: 20x5\"---  HR/TR: 1/2 roll 2x10---  Stdg Hip PRE's: 3 way BIL RTB x20---  Supine TB March RTB 20x  STS 20x  Step ups F/L 8\" x20  Step overs 6\" x20---  Tapdowns 4\" 2x10---  [employer] Walkouts L 4 F/B/S1/S2 10x ea      Assessment:  Pt able to tolerate tx session well this date w/ no adverse effects noted from tx.  Pt compliant w/ therapy and appears to be making gains w/ program  Still requires skilled therapy  to address functional and measurable deficits that impair pt's QOL.     Plan:  D/t noted impairments and dysfunction of  L knee, PT will cont to address deficits of strength and ROM via therapeutic exs and modalities PRN and further assist w/ pain reduction.  May resume all exs as appropriate.         Cal Blackman, PT      "

## 2025-05-28 ENCOUNTER — APPOINTMENT (OUTPATIENT)
Dept: PHYSICAL THERAPY | Facility: CLINIC | Age: 35
End: 2025-05-28
Payer: COMMERCIAL

## 2025-05-29 ENCOUNTER — APPOINTMENT (OUTPATIENT)
Dept: ORTHOPEDIC SURGERY | Facility: CLINIC | Age: 35
End: 2025-05-29
Payer: COMMERCIAL

## 2025-07-16 ENCOUNTER — APPOINTMENT (OUTPATIENT)
Dept: PRIMARY CARE | Facility: CLINIC | Age: 35
End: 2025-07-16
Payer: COMMERCIAL

## 2025-07-16 VITALS
TEMPERATURE: 98.1 F | HEART RATE: 89 BPM | SYSTOLIC BLOOD PRESSURE: 109 MMHG | HEIGHT: 62 IN | BODY MASS INDEX: 36.88 KG/M2 | WEIGHT: 200.4 LBS | DIASTOLIC BLOOD PRESSURE: 76 MMHG

## 2025-07-16 DIAGNOSIS — E66.812 CLASS 2 SEVERE OBESITY DUE TO EXCESS CALORIES WITH SERIOUS COMORBIDITY AND BODY MASS INDEX (BMI) OF 36.0 TO 36.9 IN ADULT: ICD-10-CM

## 2025-07-16 DIAGNOSIS — K02.9 DENTAL CARIES: ICD-10-CM

## 2025-07-16 DIAGNOSIS — E55.9 VITAMIN D DEFICIENCY: ICD-10-CM

## 2025-07-16 DIAGNOSIS — S02.5XXA BROKEN TEETH: ICD-10-CM

## 2025-07-16 DIAGNOSIS — Z71.3 ENCOUNTER FOR WEIGHT LOSS COUNSELING: ICD-10-CM

## 2025-07-16 DIAGNOSIS — K76.0 FATTY LIVER DISEASE, NONALCOHOLIC: Primary | ICD-10-CM

## 2025-07-16 DIAGNOSIS — E78.00 PURE HYPERCHOLESTEROLEMIA: ICD-10-CM

## 2025-07-16 DIAGNOSIS — E66.01 CLASS 2 SEVERE OBESITY DUE TO EXCESS CALORIES WITH SERIOUS COMORBIDITY AND BODY MASS INDEX (BMI) OF 36.0 TO 36.9 IN ADULT: ICD-10-CM

## 2025-07-16 LAB
25(OH)D3+25(OH)D2 SERPL-MCNC: 65 NG/ML (ref 30–100)
ALBUMIN SERPL-MCNC: 4.3 G/DL (ref 3.6–5.1)
ALP SERPL-CCNC: 65 U/L (ref 31–125)
ALT SERPL-CCNC: 9 U/L (ref 6–29)
ANION GAP SERPL CALCULATED.4IONS-SCNC: 8 MMOL/L (CALC) (ref 7–17)
AST SERPL-CCNC: 14 U/L (ref 10–30)
BASOPHILS # BLD AUTO: 64 CELLS/UL (ref 0–200)
BASOPHILS NFR BLD AUTO: 0.7 %
BILIRUB SERPL-MCNC: 0.6 MG/DL (ref 0.2–1.2)
BUN SERPL-MCNC: 14 MG/DL (ref 7–25)
CALCIUM SERPL-MCNC: 9.4 MG/DL (ref 8.6–10.2)
CHLORIDE SERPL-SCNC: 103 MMOL/L (ref 98–110)
CHOLEST SERPL-MCNC: 120 MG/DL
CHOLEST/HDLC SERPL: 2.5 (CALC)
CO2 SERPL-SCNC: 28 MMOL/L (ref 20–32)
CREAT SERPL-MCNC: 0.73 MG/DL (ref 0.5–0.97)
EGFRCR SERPLBLD CKD-EPI 2021: 109 ML/MIN/1.73M2
EOSINOPHIL # BLD AUTO: 138 CELLS/UL (ref 15–500)
EOSINOPHIL NFR BLD AUTO: 1.5 %
ERYTHROCYTE [DISTWIDTH] IN BLOOD BY AUTOMATED COUNT: 13.3 % (ref 11–15)
GLUCOSE SERPL-MCNC: 94 MG/DL (ref 65–99)
HCT VFR BLD AUTO: 39.1 % (ref 35–45)
HDLC SERPL-MCNC: 48 MG/DL
HGB BLD-MCNC: 12.5 G/DL (ref 11.7–15.5)
LDLC SERPL CALC-MCNC: 54 MG/DL (CALC)
LYMPHOCYTES # BLD AUTO: 2530 CELLS/UL (ref 850–3900)
LYMPHOCYTES NFR BLD AUTO: 27.5 %
MCH RBC QN AUTO: 29.6 PG (ref 27–33)
MCHC RBC AUTO-ENTMCNC: 32 G/DL (ref 32–36)
MCV RBC AUTO: 92.7 FL (ref 80–100)
MONOCYTES # BLD AUTO: 534 CELLS/UL (ref 200–950)
MONOCYTES NFR BLD AUTO: 5.8 %
NEUTROPHILS # BLD AUTO: 5934 CELLS/UL (ref 1500–7800)
NEUTROPHILS NFR BLD AUTO: 64.5 %
NONHDLC SERPL-MCNC: 72 MG/DL (CALC)
PLATELET # BLD AUTO: 370 THOUSAND/UL (ref 140–400)
PMV BLD REES-ECKER: 9.9 FL (ref 7.5–12.5)
POTASSIUM SERPL-SCNC: 4.9 MMOL/L (ref 3.5–5.3)
PROT SERPL-MCNC: 6.8 G/DL (ref 6.1–8.1)
RBC # BLD AUTO: 4.22 MILLION/UL (ref 3.8–5.1)
SODIUM SERPL-SCNC: 139 MMOL/L (ref 135–146)
TRIGL SERPL-MCNC: 96 MG/DL
WBC # BLD AUTO: 9.2 THOUSAND/UL (ref 3.8–10.8)

## 2025-07-16 PROCEDURE — 99214 OFFICE O/P EST MOD 30 MIN: CPT | Performed by: INTERNAL MEDICINE

## 2025-07-16 PROCEDURE — G8433 SCR FOR DEP NOT CPT DOC RSN: HCPCS | Performed by: INTERNAL MEDICINE

## 2025-07-16 PROCEDURE — 1036F TOBACCO NON-USER: CPT | Performed by: INTERNAL MEDICINE

## 2025-07-16 PROCEDURE — 3008F BODY MASS INDEX DOCD: CPT | Performed by: INTERNAL MEDICINE

## 2025-07-16 RX ORDER — ROSUVASTATIN CALCIUM 20 MG/1
20 TABLET, COATED ORAL DAILY
Qty: 90 TABLET | Refills: 1 | Status: SHIPPED | OUTPATIENT
Start: 2025-07-16 | End: 2026-01-12

## 2025-07-16 RX ORDER — ESCITALOPRAM OXALATE 20 MG/1
1 TABLET ORAL
COMMUNITY
Start: 2025-06-02

## 2025-07-16 SDOH — ECONOMIC STABILITY: FOOD INSECURITY: WITHIN THE PAST 12 MONTHS, THE FOOD YOU BOUGHT JUST DIDN'T LAST AND YOU DIDN'T HAVE MONEY TO GET MORE.: NEVER TRUE

## 2025-07-16 SDOH — ECONOMIC STABILITY: FOOD INSECURITY: WITHIN THE PAST 12 MONTHS, YOU WORRIED THAT YOUR FOOD WOULD RUN OUT BEFORE YOU GOT MONEY TO BUY MORE.: NEVER TRUE

## 2025-07-16 ASSESSMENT — ENCOUNTER SYMPTOMS
DIARRHEA: 0
CHILLS: 0
DIZZINESS: 0
FEVER: 0
VOMITING: 0
COUGH: 0
CONFUSION: 0
ABDOMINAL PAIN: 0
DYSURIA: 0
SHORTNESS OF BREATH: 0
AGITATION: 0
HEMATURIA: 0
PALPITATIONS: 0
SORE THROAT: 0
BLOOD IN STOOL: 0
NAUSEA: 0
LIGHT-HEADEDNESS: 0

## 2025-07-16 ASSESSMENT — PAIN SCALES - GENERAL: PAINLEVEL_OUTOF10: 0-NO PAIN

## 2025-07-16 ASSESSMENT — COLUMBIA-SUICIDE SEVERITY RATING SCALE - C-SSRS
1. IN THE PAST MONTH, HAVE YOU WISHED YOU WERE DEAD OR WISHED YOU COULD GO TO SLEEP AND NOT WAKE UP?: NO
2. HAVE YOU ACTUALLY HAD ANY THOUGHTS OF KILLING YOURSELF?: NO
6. HAVE YOU EVER DONE ANYTHING, STARTED TO DO ANYTHING, OR PREPARED TO DO ANYTHING TO END YOUR LIFE?: NO

## 2025-07-16 NOTE — PROGRESS NOTES
Subjective   Patient ID: Mallika Gutiérrez is a 35 y.o. female who presents for Follow-up (Weight management).  History of Present Illness  Mallika Gutiérrez is a 35 year old female who presents for a three-month follow-up visit for weight management.    She has experienced a weight gain of two pounds over the past three months, attributed to reduced physical activity following a knee sprain that kept her off her feet for six weeks. She is gradually resuming her physical activity. She is currently on tirzepatide and has lost sixty pounds since starting it. Previous trials with phentermine and metformin were not well tolerated due to adverse reactions. Her menstrual cycles remain irregular, though they have improved since starting tirzepatide.    She is taking Lexapro 10 mg, which was increased in April, without significant weight gain. Her brother, who is on Prozac and BuSpar, struggles with weight gain, but she has not had similar issues in regards to antidepressants and psychotropic medications..    She is on rosuvastatin for cholesterol management, with levels last checked in January and within normal limits. She plans to have it rechecked before her next visit. She has two refills remaining on her vitamin D prescription, which was previously low.    She experiences a sensation of high sugar levels when consuming desserts and soda, which she manages by drinking water. Her last hemoglobin A1c was 4.9, indicating no current issues with blood sugar levels.    She has a history of dental issues, including multiple broken teeth, but has been avoiding dental visits due to anxiety. No infections have resulted from these dental issues.    She works at [employer] in [place], handling large products. She is off on Wednesdays and works Monday, Tuesday, Thursday, Friday, and Saturday.    Review of Systems   Constitutional:  Negative for chills and fever.   HENT:  Negative for sore throat.    Eyes:  Negative for visual  "disturbance.   Respiratory:  Negative for cough and shortness of breath.    Cardiovascular:  Negative for chest pain, palpitations and leg swelling.   Gastrointestinal:  Negative for abdominal pain, blood in stool, diarrhea, nausea and vomiting.   Genitourinary:  Negative for dysuria and hematuria.   Skin:  Negative for rash.   Neurological:  Negative for dizziness, syncope and light-headedness.   Psychiatric/Behavioral:  Negative for agitation and confusion.        Objective     /76 (BP Location: Left arm, Patient Position: Sitting, BP Cuff Size: Adult)   Pulse 89   Temp 36.7 °C (98.1 °F)   Ht 1.575 m (5' 2\")   Wt 90.9 kg (200 lb 6.4 oz)   BMI 36.65 kg/m²      Physical Exam  Vitals and nursing note reviewed.   Constitutional:       General: She is not in acute distress.     Appearance: Normal appearance. She is obese. She is not ill-appearing, toxic-appearing or diaphoretic.   HENT:      Head: Normocephalic and atraumatic.      Nose: No rhinorrhea.      Mouth/Throat:      Mouth: Mucous membranes are moist.      Pharynx: Oropharynx is clear.     Eyes:      Extraocular Movements: Extraocular movements intact.      Pupils: Pupils are equal, round, and reactive to light.       Cardiovascular:      Rate and Rhythm: Normal rate and regular rhythm.      Heart sounds: Normal heart sounds. No murmur heard.  Pulmonary:      Effort: Pulmonary effort is normal. No respiratory distress.      Breath sounds: Normal breath sounds. No wheezing, rhonchi or rales.   Abdominal:      General: There is no distension.      Palpations: Abdomen is soft. There is no mass.      Tenderness: There is no abdominal tenderness. There is no guarding.     Musculoskeletal:      Cervical back: Neck supple.      Right lower leg: No edema.      Left lower leg: No edema.     Skin:     General: Skin is warm and dry.      Coloration: Skin is not jaundiced or pale.      Findings: No rash.     Neurological:      General: No focal deficit present. "      Mental Status: She is alert and oriented to person, place, and time. Mental status is at baseline.     Psychiatric:         Mood and Affect: Mood normal.         Behavior: Behavior normal.         Thought Content: Thought content normal.         Judgment: Judgment normal.        Physical Exam        Assessment & Plan  Knee Sprain  Experienced a knee sprain, which kept her off her feet for six weeks, affecting her physical activity and contributing to a slight weight gain. She is gradually resuming her physical activities.  - Continue gradual return to physical activity as tolerated.    Weight Management  Lost 60 pounds on Zepbound but experienced a slight weight gain due to reduced activity from a knee sprain. Currently on a 10 mg dose of Zepbound and plans to increase the dose after finishing the current supply. On Lexapro, which may affect weight, but she has not experienced significant issues with weight gain from it. Discussed potential increase in Zepbound dose if weight loss goals are not met.  - Continue Zepbound 10 mg until the current supply is finished.  - Plan to increase Zepbound dose at the next visit if needed.  - Monitor weight and activity levels.  - Order blood work to check fasting sugar and other parameters.    Fatty liver disease: Chronic, stable we will continue with weight loss management with prescription Zepbound.  Continue with cholesterol management and low-fat diet    Irregular Menstrual Cycle  Experiences irregular menstrual cycles, which were previously managed with metformin. However, she could not tolerate metformin due to gastrointestinal side effects. Zepbound may help with menstrual regularity. Discussed potential use of spironolactone for hirsutism, but concerns about blood pressure effects were noted.  - Monitor menstrual cycle regularity.  -should establish care with OB/GYN for pap smear and female exam    Hypercholesterolemia  Cholesterol levels were last checked in January  and were within a good range. Currently on rosuvastatin and requires a refill. Plan to recheck cholesterol before the next visit.  - Refill rosuvastatin prescription.  - Order cholesterol panel before the next visit.    Vitamin D Deficiency  Vitamin D deficiency managed with supplementation. She has two refills remaining on her 90-day scripts. Plan to recheck vitamin D levels.  - Order vitamin D level as part of blood work.    Dental Issues  Has multiple broken teeth and cavities but has been avoiding dental treatment due to anxiety about dental procedures. Discussed referral to oral surgery for consultation and potential extraction under anesthesia to alleviate anxiety and ensure comprehensive care.  - Refer to oral surgery for consultation and potential extraction under anesthesia.    Results  Hemoglobin A1c: 4.9% (01/16/2025)    Problem List Items Addressed This Visit       Encounter for weight loss counseling    Fatty liver disease, nonalcoholic - Primary    Relevant Orders    CBC and Auto Differential    Comprehensive metabolic panel    Pure hypercholesterolemia    Relevant Medications    rosuvastatin (Crestor) 20 mg tablet    Other Relevant Orders    CBC and Auto Differential    Comprehensive metabolic panel    Lipid panel    Vitamin D deficiency    Relevant Orders    Vitamin D 25-Hydroxy,Total (for eval of Vitamin D levels)    Class 2 severe obesity due to excess calories with serious comorbidity and body mass index (BMI) of 36.0 to 36.9 in adult     Other Visit Diagnoses         Dental caries        Relevant Orders    Referral to Oral Maxillofacial Surgery      Broken teeth        Relevant Orders    Referral to Oral Maxillofacial Surgery              Luis Armando Alves DO     This medical note was created with the assistance of artificial intelligence (AI) for documentation purposes. The content has been reviewed and confirmed by the healthcare provider for accuracy and completeness. Patient consented to the  use of audio recording and use of AI during their visit.

## 2025-07-28 ENCOUNTER — APPOINTMENT (OUTPATIENT)
Dept: PRIMARY CARE | Facility: CLINIC | Age: 35
End: 2025-07-28
Payer: COMMERCIAL

## 2025-10-08 ENCOUNTER — APPOINTMENT (OUTPATIENT)
Dept: PRIMARY CARE | Facility: CLINIC | Age: 35
End: 2025-10-08
Payer: COMMERCIAL